# Patient Record
Sex: FEMALE | Race: WHITE | NOT HISPANIC OR LATINO | Employment: PART TIME | URBAN - METROPOLITAN AREA
[De-identification: names, ages, dates, MRNs, and addresses within clinical notes are randomized per-mention and may not be internally consistent; named-entity substitution may affect disease eponyms.]

---

## 2018-04-02 ENCOUNTER — RECORDS - HEALTHEAST (OUTPATIENT)
Dept: LAB | Facility: CLINIC | Age: 48
End: 2018-04-02

## 2018-04-02 LAB
ALBUMIN SERPL-MCNC: 3.3 G/DL (ref 3.5–5)
ALP SERPL-CCNC: 70 U/L (ref 45–120)
ALT SERPL W P-5'-P-CCNC: 12 U/L (ref 0–45)
ANION GAP SERPL CALCULATED.3IONS-SCNC: 11 MMOL/L (ref 5–18)
AST SERPL W P-5'-P-CCNC: 18 U/L (ref 0–40)
BILIRUB SERPL-MCNC: 0.5 MG/DL (ref 0–1)
BUN SERPL-MCNC: 13 MG/DL (ref 8–22)
CALCIUM SERPL-MCNC: 8.8 MG/DL (ref 8.5–10.5)
CHLORIDE BLD-SCNC: 100 MMOL/L (ref 98–107)
CO2 SERPL-SCNC: 24 MMOL/L (ref 22–31)
CREAT SERPL-MCNC: 0.69 MG/DL (ref 0.6–1.1)
ERYTHROCYTE [SEDIMENTATION RATE] IN BLOOD BY WESTERGREN METHOD: 25 MM/HR (ref 0–20)
GFR SERPL CREATININE-BSD FRML MDRD: >60 ML/MIN/1.73M2
GLUCOSE BLD-MCNC: 135 MG/DL (ref 70–125)
POTASSIUM BLD-SCNC: 3.9 MMOL/L (ref 3.5–5)
PROT SERPL-MCNC: 6.6 G/DL (ref 6–8)
SODIUM SERPL-SCNC: 135 MMOL/L (ref 136–145)

## 2018-05-30 ENCOUNTER — RECORDS - HEALTHEAST (OUTPATIENT)
Dept: LAB | Facility: CLINIC | Age: 48
End: 2018-05-30

## 2018-05-30 LAB — TSH SERPL DL<=0.005 MIU/L-ACNC: 1.76 UIU/ML (ref 0.3–5)

## 2020-10-13 ENCOUNTER — HOSPITAL ENCOUNTER (OUTPATIENT)
Dept: BEHAVIORAL HEALTH | Facility: CLINIC | Age: 50
Discharge: HOME | End: 2020-10-13
Attending: FAMILY MEDICINE | Admitting: FAMILY MEDICINE
Payer: COMMERCIAL

## 2020-10-13 VITALS — BODY MASS INDEX: 23.92 KG/M2 | HEIGHT: 62 IN | WEIGHT: 130 LBS

## 2020-10-13 DIAGNOSIS — F10.20 ALCOHOL USE DISORDER, SEVERE, DEPENDENCE (H): ICD-10-CM

## 2020-10-13 PROCEDURE — H0001 ALCOHOL AND/OR DRUG ASSESS: HCPCS | Mod: 95 | Performed by: COUNSELOR

## 2020-10-13 RX ORDER — EPINEPHRINE 0.3 MG/.3ML
0.3 INJECTION SUBCUTANEOUS
Status: ON HOLD | COMMUNITY
Start: 2020-07-30 | End: 2021-12-25

## 2020-10-13 ASSESSMENT — PAIN SCALES - GENERAL: PAINLEVEL: NO PAIN (0)

## 2020-10-13 ASSESSMENT — ANXIETY QUESTIONNAIRES
7. FEELING AFRAID AS IF SOMETHING AWFUL MIGHT HAPPEN: SEVERAL DAYS
GAD7 TOTAL SCORE: 6
6. BECOMING EASILY ANNOYED OR IRRITABLE: NOT AT ALL
1. FEELING NERVOUS, ANXIOUS, OR ON EDGE: NOT AT ALL
5. BEING SO RESTLESS THAT IT IS HARD TO SIT STILL: NOT AT ALL
2. NOT BEING ABLE TO STOP OR CONTROL WORRYING: SEVERAL DAYS
4. TROUBLE RELAXING: NEARLY EVERY DAY
3. WORRYING TOO MUCH ABOUT DIFFERENT THINGS: SEVERAL DAYS

## 2020-10-13 ASSESSMENT — PATIENT HEALTH QUESTIONNAIRE - PHQ9: SUM OF ALL RESPONSES TO PHQ QUESTIONS 1-9: 7

## 2020-10-13 ASSESSMENT — MIFFLIN-ST. JEOR: SCORE: 1152.93

## 2020-10-13 NOTE — PROGRESS NOTES
"[facility]  [address] [place] [id number]                ADULT CD ASSESSMENT ADDENDUM      Patient Name: Rosey Tucker  Cell Phone: [phone]   Email:  [email or web address]  Emergency Contact: Monica Kothari (friend) Tel: [phone]    The patient reported being:      With which race do you identify? White    Initial Screening Questions     1. Are you currently having severe withdrawal symptoms that are putting yourself or others in danger?  No    2. Are you currently having severe medical problems that require immediate attention?  No    3. Are you currently having severe emotional or behavioral problems that are putting yourself or others at risk of harm?  No    4. Do you have sufficient reading skills that will enable you to understand written materials, including the program rules and client rights materials?  Yes     Family History and other additional information     Who raised you? (parents, grandparents, adoptive parents, step-parents, etc.)    Both Parents    Please tell me what it was like growing up in your family. (please include any history of substance abuse, mental health issues, emotional/physical/sexual abuse, forms of discipline, and support)     \"fairly normal. My parents clint, but not all the time. Raised [employer]ism.\"  Mom- depression, anxiety, and dementia.    Do you have any children or Stepchildren? Yes, explain: 3 children- 2 adult and 1 minor    Are you being investigated by Child Protection Services? No    Do you have a child protection worker, probation office or ?  No    How would you describe your current finances?  Just making it    If you are having problems, (unpaid bills, bankruptcy, IRS problems) please explain:  No    If working or a student are you able to function appropriately in that setting? Yes     Describe your preferred learning style:  by hands-on practice    What are your some of your personal strengths?  \"I am patient. I cannot " "lie. I am not good at that. I like to think I am a nice person.\"    Do you currently participate in community shabbir activities, such as attending Pentecostalism, temple, [employer] or Worship services?  No    How does your spirituality impact your recovery?  \"I don't know. I am hoping it will help. I am not connected to any particular paris, but I think it would be good.\"    Do you currently self-administer your medications?  Yes    Have you ever had to lie to people important to you about how much you sullivan?   No   Have you ever felt the need to bet more and more money?   No   Have you ever attempted treatment for a gambling problem?   No   Have you ever touched or fondled someone else inappropriately or forced them to have sex with you against their will?   No   Are you or have you ever been a registered sex offender?   No   Is there any history of sexual abuse in your family? No   Have you ever felt obsessed by your sexual behavior, such as having sex with many partners, masturbating often, using pornography often?   No     Have you ever received therapy or stayed in the hospital for mental health problems?   Yes, explain: currently in therapy     Have you ever hurt yourself, such as cutting, burning or hitting yourself?   No     Have you ever purged, binged or restricted yourself as a way to control your weight?   No     Are you on a special diet?   No     Do you have any concerns regarding your nutritional status?   No     Have you had any appetite changes in the last 3 months?   No   Have you had weight loss or weight gain of more than 10 lbs in the last 3 months?   If patient gained or lost more than 10 lbs, then refer to program RN / attending Physician for assessment.   No   Was the patient informed of BMI?    Normal, No Intervention   No   Have you engaged in any risk-taking behavior that would put you at risk for exposure to blood-borne or sexually transmitted diseases?   No   Do you have any dental problems?   " "No   Have you ever lived through any trauma or stressful life events?   Yes- \"when my brother passed away, divorce.\" When my daughter attempted suicide. When I moved my mom and attempted to clean out her house. My dad passed away.\"   In the past month, have you had any of the following symptoms related to the trauma listed above? (dreams, intense memories, flashbacks, physical reactions, etc.)   No   Have you ever believed people were spying on you, or that someone was plotting against you or trying to hurt you?   No   Have you ever believed someone was reading your mind or could hear your thoughts or that you could actually read someone's mind or hear what another person was thinking?   No   Have you ever believed that someone of some force outside of yourself was putting thoughts into your mind or made you act in a way that was not your usual self?  Have you ever though you were possessed?   No   Have you ever believed you were being sent special messages through the TV, radio or newspaper?   No   Have you ever heard things other people couldn't hear, such as voices or other noises?   No   Have you ever had visions when you were awake?  Or have you ever seen things other people couldn't see?   No   Do you have a valid 's license?    Yes     PHQ-9, TRINY-7 and Suicide Risk Assessment   PHQ-9 on 10/13/2020 TRINY-7 on 10/13/2020   The patient's PHQ-9 score was 7 out of 27, indicating mild depression.   The patient's TRINY-7 score was 6 out of 21, indicating mild anxiety.       [place]-Suicide Severity Rating Scale   Suicide Ideation   1.) Have you ever wished you were dead or that you could go to sleep and not wake up?     Lifetime:  No   Past Month:  No     2.) Have you actually had any thoughts of killing yourself?   Lifetime:  No   Past Month:  No     3.) Have you been thinking about how you might do this?     Lifetime:  No   Past Month:  No     4.) Have you had these thoughts and had some intention of acting on " them?     Lifetime:  No   Past Month:  No     5.) Have you started to work out the details of how to kill yourself?   Lifetime:  No   Past Month:  No     6.) Do you intend to carry out this plan?      Lifetime:  No   Past Month:  No     Intensity of Ideation   Intensity of ideation (1 being least severe, 5 being most severe):     Lifetime:  The patient denied ever having any suicidal thoughts in life.   Past Month:  The patient denied ever having any suicidal thoughts in life.     How often do you have these thoughts?  The patient denied ever having any suicidal thoughts in life.     When you have the thoughts how long do they last?  The patient denied ever having any suicidal thoughts in life.     Can you stop thinking about killing yourself or wanting to die if you want to?  The patient denied ever having any suicidal thoughts in life.     Are there things - anyone or anything (i.e. family, Islam, pain of death) that stopped you from wanting to die or acting on thoughts of suicide?  Does not apply     What sort of reasons did you have for thinking about wanting to die or killing yourself (ie end pain, stop how you were feeling, get attention or reaction, revenge)?  Does not apply     Suicidal Behavior   (Suicide Attempt) - Have you made a suicide attempt?     Lifetime:  The patient had never made a suicide attempt.   Past Month:  The patient had never made a suicide attempt.     Have you engaged in self-harm (non-suicidal self-injury)?  The patient denied having any history of engaging in self-harm (non-suicidal self-injury).     (Interrupted Attempt) - Has there been a time when you started to do something to end your life but someone or something stopped you before you actually did anything?  No     (Aborted or Self-Interrupted Attempt) - Has there been a time when you started to do something to try to end your life but you stopped yourself before you actually did anything?  No     (Preparatory Acts of  Behavior) - Have you taken any steps towards making suicide attempt or preparing to kill yourself (such as collecting pills, getting a gun, giving valuables away or writing a suicide note)?  No     Actual Lethality/Medical Damage:  The patient denied ever making a suicidal attempt.       2008  [employer] [employer]  Used with permission by Mi Barcenas, PhD.       Guide to C-SSRS Risk Ratings   NO IDEATION:  with no active thoughts IDEATION: with a wish to die. IDEATION: with active thoughts. Risk Ratings   If Yes No No 0 - Very Low Risk   If NA Yes No 1 - Low Risk   If NA Yes Yes 2 - Low/moderate risk   IDEATION: associated thoughts of methods without intent or plan INTENT: Intent to follow through on suicide PLAN: Plan to follow through on suicide Risk Ratings cont...   If Yes No No 3 - Moderate Risk   If Yes Yes No 4 - High Risk   If Yes Yes Yes 5 - High Risk   The patient's ADDITIONAL RISK FACTORS and lack of PROTECTIVE FACTORS may increase their overall suicide risk ratings.     Additional Risk Factors:    A recent death of someone close to the patient and/or unresolved grief and loss issues     A recent loss that was significant to the patient, i.e. loss of job, loss of home, divorce, break-up, etc.   Protective Factors:    Having people in his/her life that would prevent the patient from considering a suicide attempt (i.e. young children, spouse, parents, etc.)     Having pet(s) that give companionship and/or would prevent the patient from considering a suicide attempt     A positive relationship with his/her clinical medical and/or mental health providers     Having easy access to supportive family members     Having a good community support network     Having cultural, Rastafari or spiritual beliefs that discourage suicide     Having restricted access to highly lethal means of suicide     Risk Status   Past month: 0. - Very Low Risk:  Evaluation Counselors:  Document in Epic /  "SBAR to counselor \"Very Low Risk\".      Treatment Counselors:  Reassess upon admission as applicable, assess weekly in progress notes under Dimension 3 and summarize in Discharge / Treatment summary under Dimension 3.    Past 24 hours: 0. - Very Low Risk:  Evaluation Counselors:  Document in Epic / SBAR to counselor \"Very Low Risk\".      Treatment Counselors:  Reassess upon admission as applicable, assess weekly in progress notes under Dimension 3 and summarize in Discharge / Treatment summary under Dimension 3.   Additional information to support suicide risk rating: There was no additional information to provide at this time.     Mental Health Status   Physical Appearance/Attire: Unable to assess due to telephone visit.   Hygiene: Unable to assess due to telephone visit.   Eye Contact: Unable to assess due to telephone visit.   Speech Rate:  regular   Speech Volume: regular   Speech Quality: fluid   Cognitive/Perceptual:  reality based   Cognition: memory intact    Judgment: intact   Insight: intact   Orientation:  time, place, person and situation   Thought: logical    Hallucinations:  none   General Behavioral Tone: cooperative   Psychomotor Activity: Unable to assess due to telephone visit.   Gait:  Unable to assess due to telephone visit.   Mood: appropriate   Affect: congruence/appropriate   Counselor Notes: NA     Criteria for Diagnosis: DSM-5 Criteria for Substance Use Disorders      Alcohol Use Disorder Severe - 303.90 (F10.20)  Depression NOS, per patient self-report    Level of Care   I.) Intoxication and Withdrawal: 0   II.) Biomedical:  0   III.) Emotional and Behavioral:  2   IV.) Readiness to Change:  1   V.) Relapse Potential: 2   VI.) Recovery Environmental: 1     Initial Problem List     The patient lacks relapse prevention skills  The patient has poor coping skills  The patient has poor refusal skills   The patient lacks a sober peer support network  The patient has a tendency to isolate  The " patient has a significant history of grief and loss issues    Patient/Client is willing to follow treatment recommendations.  Yes    Counselor: DARIUS Tejada

## 2020-10-13 NOTE — PROGRESS NOTES
"The patient has been notified of the following:      \"We have found that certain health care needs can be provided without the need for a face to face visit.  This service lets us provide the care you need with a phone conversation.       I will have full access to your [facility] medical record during this entire phone call.   I will be taking notes for your medical record.      Since this is like an office visit, we will bill your insurance company for this service.       There are potential benefits and risks of telephone visits (e.g. limits to patient confidentiality) that differ from in-person visits.?  Confidentiality still applies for telephone services, and nobody will record the visit.  It is important to be in a quiet, private space that is free of distractions (including cell phone or other devices) during the visit.??      If during the course of the call I believe a telephone visit is not appropriate, you will not be charged for this service\"     Consent has been obtained for this service by care team member: Yes     Start Time: 12:30pm  End Time: 1:44pm    Rule 25 Assessment  Background Information   1. Date of Assessment Request  2. Date of Assessment  10/13/2020 3. Date Service Authorized     4.   ADARSH Richards     5.  Phone Number   [phone] 6. Referent  Self 7. Assessment Site  [facility]     8. Client Name   Rosey Tucker 9. Date of Birth  1970 Age  50 year old 10. Gender  female  11. PMI/ Insurance No.  [id number]   12. Client's Primary Language:  English 13. Do you require special accommodations, such as an  or assistance with written material? No   14. Current Address: [address] [place] [address]   15. Client Phone Numbers: [phone]      16. Tell me what has happened to bring you here today.    \"my son looked up this number and said I needed this assessment.\"     17. Have you had " "other rule 25 assessments?     No    DIMENSION I - Acute Intoxication /Withdrawal Potential   1. Chemical use most recent 12 months outside a facility and other significant use history (client self-report)              X = Primary Drug Used   Age of First Use Most Recent Pattern of Use and Duration   Need enough information to show pattern (both frequency and amounts) and to show tolerance for each chemical that has a diagnosis   Date of last use and time, if needed   Withdrawal Potential? Requiring special care Method of use  (oral, smoked, snort, IV, etc)   x   Alcohol     14 Past year: \"every day I have to have 2-3 glasses of wine.\" This has been going on for over a year.    Weekends: drinking a bottle of wine on Friday and Saturday, sometimes on Sunday a couple of beers.  Weekdays: 2-3 glasses of wine. Some days none.    Hard alcohol: some fireball shots once in a while,   Mostly drinking wine.   10/12/20  2 larege glasses of wine  oral      Marijuana/  Hashish   16 Used in college. 25 years ago no smoke      Cocaine/Crack     No use          Meth/  Amphetamines   No use          Heroin     No use          Other Opiates/  Synthetics   No use          Inhalants     No use          Benzodiazepines     No use          Hallucinogens     No use          Barbiturates/  Sedatives/  Hypnotics No use          Over-the-Counter Drugs   No use          Other     No use          Nicotine     14 Current: some day smoker; generally 1-5 cigarettes per day. 10/13/20 no smoke     2. Do you use greater amounts of alcohol/other drugs to feel intoxicated or achieve the desired effect?  Yes.  Or use the same amount and get less of an effect?  Yes.  Example: The patient reported having increased use and tolerance issues with alcohol.    3A. Have you ever been to detox?     No    3B. When was the first time?     The patient denied ever having a detoxification admission.    3C. How many times since then?     The patient denied ever " having a detoxification admission.    3D. Date of most recent detox:     The patient denied ever having a detoxification admission.    4.  Withdrawal symptoms: Have you had any of the following withdrawal symptoms?  Past 12 months Recent (past 30 days)   Headache  Fatigue / Extremely Tired  Sad / Depressed Feeling Headache  Fatigue / Extremely Tired  Sad / Depressed Feeling     's Visual Observations and Symptoms: No visible withdrawal symptoms at this time    Based on the above information, is withdrawal likely to require attention as part of treatment participation?  No    Dimension I Ratings   Acute intoxication/Withdrawal potential - The placing authority must use the criteria in Dimension I to determine a client s acute intoxication and withdrawal potential.    RISK DESCRIPTIONS - Severity ratin Client displays full functioning with good ability to tolerate and cope with withdrawal discomfort. No signs or symptoms of intoxication or withdrawal or resolving signs or symptoms.    REASONS SEVERITY WAS ASSIGNED (What about the amount of the person s use and date of most recent use and history of withdrawal problems suggests the potential of withdrawal symptoms requiring professional assistance? )     Patient reports that her last use of alcohol was on 10/12/2020.  Pt denies having any feelings of withdrawal.          DIMENSION II - Biomedical Complications and Conditions   1a. Do you have any current health/medical conditions?(Include any infectious diseases, allergies, or chronic or acute pain, history of chronic conditions)       Acid reflux.    1b. On a scale of mild, moderate to severe please specify the severity of the patient's diabetes and/or neuropathy.    The patient denied having a history of being diagnosed with diabetes or neuropathy.    2. Do you have a health care provider? When was your most recent appointment? What concerns were identified?     The patient's PCP is Dr. Armando  Adolfo.  The patient's Primary Medical Clinic is [facility].  The patient's last medical appointment was a couple of months ago.    3. If indicated by answers to items 1 or 2: How do you deal with these concerns? Is that working for you? If you are not receiving care for this problem, why not?      The patient reported taking prescription medications as prescribed for the above medical issues.    4A. List current medication(s) including over-the-counter or herbal supplements--including pain management:     pantoprazole (?)  Generic depression med    4B. Do you follow current medical recommendations/take medications as prescribed?     Yes    4C. When did you last take your medication?     10/12/20    4D. Do you need a referral to have a follow up with a primary care physician?    No.    5. Has a health care provider/healer ever recommended that you reduce or quit alcohol/drug use?     No    6. Are you pregnant?     No    7. Have you had any injuries, assaults/violence towards you, accidents, health related issues, overdose(s) or hospitalizations related to your use of alcohol or other drugs:     No    8. Do you have any specific physical needs/accommodations? No    Dimension II Ratings   Biomedical Conditions and Complications - The placing authority must use the criteria in Dimension II to determine a client s biomedical conditions and complications.   RISK DESCRIPTIONS - Severity ratin Client displays full functioning with good ability to cope with physical discomfort.    REASONS SEVERITY WAS ASSIGNED (What physical/medical problems does this person have that would inhibit his or her ability to participate in treatment? What issues does he or she have that require assistance to address?)    Patient denies having any chronic biomedical conditions that would interfere with treatment or any recovery skills training/workshop. Pt reports having acid reflux and takes pantoprazole for it.  Pt denies  "having pain at this time and reports her pain level is a 0 on the 0-10 Pain Rating Scale. Pt reports that she is a daily cigarette smoker.         DIMENSION III - Emotional, Behavioral, Cognitive Conditions and Complications   1. (Optional) Tell me what it was like growing up in your family. (substance use, mental health, discipline, abuse, support)     \"fairly normal. My parents clint, but not all the time. Raised [employer].\"  Mom- depression, anxiety, and dementia.     2. When was the last time that you had significant problems...  A. with feeling very trapped, lonely, sad, blue, depressed or hopeless  about the future? Past Month- \"Covid is not helping at all.\"     B. with sleep trouble, such as bad dreams, sleeping restlessly, or falling  asleep during the day? Past Month- hard time falling asleep.    C. with feeling very anxious, nervous, tense, scared, panicked, or like  something bad was going to happen? Never    D. with becoming very distressed and upset when something reminded  you of the past? Never    E. with thinking about ending your life or committing suicide? Never    3. When was the last time that you did the following things two or more times?  A. Lied or conned to get things you wanted or to avoid having to do  something? Never    B. Had a hard time paying attention at school, work, or home? Never    C. Had a hard time listening to instructions at school, work, or home? Never    D. Were a bully or threatened other people? Never    E. Started physical fights with other people? Never    Note: These questions are from the Global Appraisal of Individual Needs--Short Screener. Any item marked  past month  or  2 to 12 months ago  will be scored with a severity rating of at least 2.     For each item that has occurred in the past month or past year ask follow up questions to determine how often the person has felt this way or has the behavior occurred? How recently? How has it affected their daily living? " "And, whether they were using or in withdrawal at the time?    See above    4A. If the person has answered item 2E with  in the past year  or  the past month , ask about frequency and history of suicide in the family or someone close and whether they were under the influence.     Not in the past year    Any history of suicide in your family? Or someone close to you?     \"my daughter attempted. That lingers a bit.\"    4B. If the person answered item 2E  in the past month  ask about  intent, plan, means and access and any other follow-up information  to determine imminent risk. Document any actions taken to intervene  on any identified imminent risk.      The patient denied having any suicide ideation within the past month.    5A. Have you ever been diagnosed with a mental health problem?     Yes, explain: \"depression\"      5B. Are you receiving care for any mental health issues? If yes, what is the focus of that care or treatment?  Are you satisfied with the service? Most recent appointment?  How has it been helpful?     In the past she has worked with a therapist. She started [facility] last week.    6. Have you been prescribed medications for emotional/psychological problems?     Yes, a generic depression medication.    7. Does your MH provider know about your use?     Yes.  7B. What does he or she have to say about it?(DSM) \"she basically said pay attention and ask yourself why do you need that second glass.\"    8A. Have you ever been verbally, emotionally, physically or sexually abused?      No     Follow up questions to learn current risk, continuing emotional impact.      The patient denied having any history of being verbally, emotionally, physically or sexually abused.    8B. Have you received counseling for abuse?      The patient denied having any history of being verbally, emotionally, physically or sexually abused.    9. Have you ever experienced or been part of a group that experienced community violence, " historical trauma, rape or assault?     No    10A. [place]:    No    11. Do you have problems with any of the following things in your daily life?    Problem Solving and Remembering      Note: If the person has any of the above problems, follow up with items 12, 13, and 14. If none of the issues in item 11 are a problem for the person, skip to item 15.    The patient would benefit from developing sober coping skills.    12. Have you been diagnosed with traumatic brain injury or Alzheimer s?  No    13. If the answer to #12 is no, ask the following questions:    Have you ever hit your head or been hit on the head? No    Were you ever seen in the Emergency Room, hospital or by a doctor because of an injury to your head? No    Have you had any significant illness that affected your brain (brain tumor, meningitis, West Nile Virus, stroke or seizure, heart attack, near drowning or near suffocation)? No    14. If the answer to #12 is yes, ask if any of the problems identified in #11 occurred since the head injury or loss of oxygen. The patient had never had a head injury or a loss of oxygen.    15A. Highest grade of school completed:     Associate degree/vocational certificate    15B. Do you have a learning disability? No    15C. Did you ever have tutoring in Math or English? No    15D. Have you ever been diagnosed with Fetal Alcohol Effects or Fetal Alcohol Syndrome? No    16. If yes to item 15 B, C, or D: How has this affected your use or been affected by your use?     The patient denied having any history of a learning disability, tutoring in math or English or being diagnosed with Fetal Alcohol Effects or Fetal Alcohol Syndrome.    Dimension III Ratings   Emotional/Behavioral/Cognitive - The placing authority must use the criteria in Dimension III to determine a client s emotional, behavioral, and cognitive conditions and complications.   RISK DESCRIPTIONS - Severity ratin Client has difficulty with impulse control  "and lacks coping skills. Client has thoughts of suicide or harm to others without means; however, the thoughts may interfere with participation in some treatment activities. Client has difficulty functioning in significant life areas. Client has moderate symptoms of emotional, behavioral, or cognitive problems. Client is able to participate in most treatment activities.    REASONS SEVERITY WAS ASSIGNED - What current issues might with thinking, feelings or behavior pose barriers to participation in a treatment program? What coping skills or other assets does the person have to offset those issues? Are these problems that can be initially accommodated by a treatment provider? If not, what specialized skills or attributes must a provider have?    The patient reports having mental health diagnosis of depression. Pt is taking a generic depression medication for her mental health at this time. She started seeing a [facility] counselor last week. Pt denies a history of abuse. At the time of the assessment, pt's PHQ-9 score was 7 (mild depression) and her TRINY-7 score was 6 (mild anxiety). Pt denies SIB, SA, suicidal thoughts at this time. During pt's assessment, her [place]-Suicide Severity Rating Scale score was 0. - Very Low Risk.         DIMENSION IV - Readiness for Change   1. You ve told me what brought you here today. (first section) What do you think the problem really is?     \"I don't know. I suppose not being able to stop after that first, second glass of wine. I try to ignore all the negative. Not being able to keep up.\" Drinking to numb/block out things that are going on.    2. Tell me how things are going. Ask enough questions to determine whether the person has use related problems or assets that can be built upon in the following areas: Family/friends/relationships; Legal; Financial; Emotional; Educational; Recreational/ leisure; Vocational/employment; Living arrangements (DSM)      The patient currently lives with " "her 16 year old son.  The patient denied having any concerns regarding her immediate living environment or neighborhood. The patient reported having a relationship conflict with her children due to her ongoing alcohol abuse issues. The patient identified as being heterosexual and she reports she is in a romantic relationship with her boyfriend for the past 3 years. The patient denied having a history of legal issues. 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 The patient lacks a current sober peer support network.    3. What activities have you engaged in when using alcohol/other drugs that could be hazardous to you or others (i.e. driving a car/motorcycle/boat, operating machinery, unsafe sex, sharing needles for drugs or tattoos, etc     The patient denied engaging in any of the above dangerous activities when using alcohol and/or drugs.    4. How much time do you spend getting, using or getting over using alcohol or drugs? (DSM)     Weekdays: \"after work, about 5.\" She'd have her last glass at \"probably 9, maybe 10\"pm.  Weekends: \"probably around the same time, maybe 4 if I am home. I stay up a little bit later, maybe 11:30pm.\"    5. Reasons for drinking/drug use (Use the space below to record answers. It may not be necessary to ask each item.)  Like the feeling Yes   Trying to forget problems Yes   To cope with stress Yes   To relieve physical pain No   To cope with anxiety No   To cope with depression Yes   To relax or unwind Yes   Makes it easier to talk with people sometimes   Partner encourages use No   Most friends drink or use Yes   To cope with family problems No   Afraid of withdrawal symptoms/to feel better No   Other (specify)  No     A. What concerns other people about your alcohol or drug use/Has anyone told you that you use too much? What did they say? (DSM)     Children's concerns: \"I don't know I guess. They want me to be healthy.\" Her 16 year old son wanted " "her to complete this assessment.    B. When did you first think you had a problem with drugs or alcohol?    Alcohol: \"probably a year ago when it started getting a little bit crazy.\"    6. What changes are you willing to make? What substance are you willing to stop using? How are you going to do that? Have you tried that before? What interfered with your success with that goal?      What specific changes are you willing to make? \"I guess I would really like to be able to keep the drinking to socializing on weekends. Even then, it wouldn't have to be every weekend.\"  Why do you want to make this change? \"just to be a better mom again, the respect of my kids.\"  What are you willing to commit to in order to make this change? \"I don't know. Whatever I have to do.\"  What is getting in your way to make this change? \"just me.\"    7. What would be helpful to you in making this change?     \"I don't know. I honestly think I can do it. I just need to come up with some [id number]. Maybe some Bible sentences or verses. Meditation. I don't think I have time to do it. I would love to go to [employer], but you can't because of covid.\"    Dimension IV Ratings   Readiness for Change - The placing authority must use the criteria in Dimension IV to determine a client s readiness for change.   RISK DESCRIPTIONS - Severity ratin Client is motivated with active reinforcement, to explore treatment and strategies for change, but ambivalent about illness or need for change.    REASONS SEVERITY WAS ASSIGNED - (What information did the person provide that supports your assessment of his or her readiness to change? How aware is the person of problems caused by continued use? How willing is she or he to make changes? What does the person feel would be helpful? What has the person been able to do without help?)      Pt's 16 year old son asked her to complete this CD evaluation.  She thought she had a problem with alcohol about a year ago when " "she began drinking more. When it comes to her drinking, \"I just want to be able to control it. I don't know if that\" she wants to stop drinking. She is open to attending programming at [facility] if insurance covers the program. She is also open to working with a psychotherapist who is well versed with addiction.           DIMENSION V - Relapse, Continued Use, and Continued Problem Potential   1A. In what ways have you tried to control, cut-down or quit your use? If you have had periods of sobriety, how did you accomplish that? What was helpful? What happened to prevent you from continuing your sobriety? (DSM)     Control: \"I guess I try to tell myself that I don't need it. Go for a walk.\"  Longest sober time: 4 days.  How did you do it: \"I just didn't stop and get a bottle of wine. I made myself drive right home.\"  Why did you relapse? \"because it was the weekend.\"  What are your reasons for using? \"some times I honestly I enjoy cleaning and having a glass of wine. Cooking and having a glass of wine. putzing around...\"    1B. What were the circumstances of your most recent relapse with mood altering chemicals?    She drank because it was the weekend.    2. Have you experienced cravings? If yes, ask follow up questions to determine if the person recognizes triggers and if the person has had any success in dealing with them.     In the past 30 days, on a scale of 0-10 (0 least severe to 10 being most severe), how would you rate your cravings?  Alcohol: 6    3. Have you been treated for alcohol/other drug abuse/dependence? No    4. Support group participation: Have you/do you attend support group meetings to reduce/stop your alcohol/drug use? How recently? What was your experience? Are you willing to restart? If the person has not participated, is he or she willing?     She has never attended before. She reports she thought about it, but didn't go.    5. What would assist you in staying sober/straight?     \"I " "just want to be able to control it. I don't know if that\" she wants to stop drinking.    Dimension V Ratings   Relapse/Continued Use/Continued problem potential - The placing authority must use the criteria in Dimension V to determine a client s relapse, continued use, and continued problem potential.   RISK DESCRIPTIONS - Severity ratin (A) Client has minimal recognition and understanding of relapse and recidivism issues and displays moderate vulnerability for further substance use or mental health problems. (B) Client has some coping skills inconsistently applied.    REASONS SEVERITY WAS ASSIGNED - (What information did the person provide that indicates his or her understanding of relapse issues? What about the person s experience indicates how prone he or she is to relapse? What coping skills does the person have that decrease relapse potential?)      Pt denies having ever attended a cd treatment program, a detox program, or a 12 step meeting before. Pt lacks education into her disease of addiction. In the past 30 days, pt rates her cravings for alcohol as a 6 on the 0-10 Craving Scale. Pt identified her biggest reasons to drink is \"some times I honestly I enjoy cleaning and having a glass of wine. Cooking and having a glass of wine. putzing around...\" Pt displays moderate vulnerability for further substance use.         DIMENSION VI - Recovery Environment   1. Are you employed/attending school? Tell me about that.     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  Work hours: she works 8 hour shifts, 3 days a week- Monday, Wednesday, and Thursday and 2 half shifts. She usually works 9-5pm. \"In home care it is on and off.\"    2A. Describe a typical day; evening for you. Work, school, social, leisure, volunteer, spiritual practices. Include time spent obtaining, using, recovering from drugs or alcohol. (DSM)     \"I start with a morning huddle. My mornings are not routine, but " "I would like to get to that. I start with that and then let the dog out. Gather my work stuff, make a few phone calls, head out on the road. Laundry. My days are not typical, they are all different.\"    Hobbies: \"I don't have any right now. I used to run. I like hiking. I did go for a hike a couple of weeks ago.\"    Please describe what leisure activities have been associated with your substance abuse:     Cooking, cleaning    2B. How often do you spend more time than you planned using or use more than you planned? (DSM)     \"on the weekend for sure. And some nights during the week.\"    3. How important is using to your social connections? Do many of your family or friends use?     Friends: 98% drink    4A. Are you currently in a significant relationship?     Yes.  4B. How long? 3 years            Please describe your significant other's use of mood altering chemicals? He doesn't drink often. \"we will go out and hit some breweries.\"    4C. Sexual Orientation:     Heterosexual    5A. Who do you live with?      Her 16 year old son.    5B. Tell me about their alcohol/drug use and mental health issues.     none    5C. Are you concerned for your safety there? No    5D. Are you concerned about the safety of anyone else who lives with you? No    6A. Do you have children who live with you?     Yes- 16 year old son    6B. Do you have children who do not live with you?     Yes- 2 daughters. One in college and the other is 22.    7A. Who supports you in making changes in your alcohol or drug use? What are they willing to do to support you? Who is upset or angry about you making changes in your alcohol or drug use? How big a problem is this for you?      \"2 best friends. My kids.\"    7B. This table is provided to record information about the person s relationships and available support It is not necessary to ask each item; only to get a comprehensive picture of their support system.  How often can you count on the following " people when you need someone?   Partner / Spouse Usually supportive   Parent(s)/Aunt(s)/Uncle(s)/Grandparents Rarely supportive- mom has dementia    Sibling(s)/Cousin(s) Usually supportive   Child(steph) Usually supportive   Other relative(s) Usually supportive   Friend(s)/neighbor(s) Usually supportive   Child(steph) s father(s)/mother(s) Always supportive   Support group member(s) The patient denied having any current involvement with 12-step or other support group meetings.   Community of shabbir members The patient denied having any current involvement with community shabbir members.   /counselor/therapist/healer Always supportive   Other (specify) No     8A. What is your current living situation?     She has lived in the house she owns since .    8B. What is your long term plan for where you will be living?     No changes.    8C. Tell me about your living environment/neighborhood? Ask enough follow up questions to determine safety, criminal activity, availability of alcohol and drugs, supportive or antagonistic to the person making changes.      No concerns.    9. Criminal justice history: Gather current/recent history and any significant history related to substance use--Arrests? Convictions? Circumstances? Alcohol or drug involvement? Sentences? Still on probation or parole? Expectations of the court? Current court order? Any sex offenses - lifetime? What level? (DSM)    None    10. What obstacles exist to participating in treatment? (Time off work, childcare, funding, transportation, pending intermediate time, living situation)     The patient denied having any obstacles for participating in substance abuse treatment.    Dimension VI Ratings   Recovery environment - The placing authority must use the criteria in Dimension VI to determine a client s recovery environment.   RISK DESCRIPTIONS - Severity ratin Client has passive social  or family and significant other are not interested in  the client's recovery. The client is engaged in structured meaningful activity.    REASONS SEVERITY WAS ASSIGNED - (What support does the person have for making changes? What structure/stability does the person have in his or her daily life that will increase the likelihood that changes can be sustained? What problems exist in the person s environment that will jeopardize getting/staying clean and sober?)     The patient currently lives with her 16 year old son.  The patient denied having any concerns regarding her immediate living environment or neighborhood. The patient reported having a relationship conflict with her children due to her ongoing alcohol abuse issues. The patient identified as being heterosexual and she reports she is in a romantic relationship with her boyfriend for the past 3 years. The patient denied having a history of legal issues. 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 The patient lacks a current sober peer support network.         Client Choice/Exceptions   Would you like services specific to language, age, gender, culture, Tenriism preference, race, ethnicity, sexual orientation or disability?  No    What particular treatment choices and options would you like to have? None    Do you have a preference for a particular treatment program? None    Criteria for Diagnosis     Criteria for Diagnosis  DSM-5 Criteria for Substance Use Disorder  Instructions: Determine whether the client currently meets the criteria for Substance Use Disorder using the diagnostic criteria in the DSM-V pp.481-58. Current means during the most recent 12 months outside a facility that controls access to substances    Category of Substance Severity (ICD-10 Code / DSM 5 Code)     Alcohol Use Disorder Severe  (10.20) (303.90)   Cannabis Use Disorder The patient does not meet the criteria for a Cannabis use disorder.   Hallucinogen Use Disorder The patient does not meet the  criteria for a Hallucinogen use disorder.   Inhalant Use Disorder The patient does not meet the criteria for an Inhalant use disorder.   Opioid Use Disorder The patient does not meet the criteria for an Opioid use disorder.   Sedative, Hypnotic, or Anxiolytic Use Disorder The patient does not meet the criteria for a Sedative/Hypnotic use disorder.   Stimulant Related Disorder The patient does not meet the criteria for a Stimulant use disorder.   Tobacco Use Disorder Mild    (Z72.0) (305.1)   Other (or unknown) Substance Use Disorder The patient does not meet the criteria for a Other (or unknown) Substance use disorder.       Collateral Contact Summary   Number of contacts made: 1    Contact with referring person:  No    If court related records were reviewed, summarize here: No court records had been reviewed at the time of this documentation.    Information from collateral contacts supported/largely agreed with information from the client and associated risk ratings.      Rule 25 Assessment Summary and Plan   's Recommendation    1)  Complete the OP program at [facility] if covered by insurance.  2)  If OP at [facility] is not covered by insurance, please attend weekly individual psychotherapy that addresses addiction.  3)  Consider attending weekly support group meetings, such as 12 step based (AA/NA), SM[facility] Recovery, [facility], and/or [facility] meetings.     4)  Follow all the recommendations of your treatment/medical providers.      Collateral Contacts     Name:    [facility]   Relationship:    [facility]   Phone Number:    NA Releases:    NA     The patient's medical record at [facility] was reviewed and the information contained in the medical record supported the patient's account of her chemical use history and chemical use consequences.    ollateral Contacts      A problematic pattern of alcohol/drug use leading to clinically significant impairment or distress, as  manifested by at least two of the following, occurring within a 12-month period:    1.) Alcohol/drug is often taken in larger amounts or over a longer period than was intended.  2.) There is a persistent desire or unsuccessful efforts to cut down or control alcohol/drug use  3.) A great deal of time is spent in activities necessary to obtain alcohol, use alcohol, or recover from its effects.  4.) Craving, or a strong desire or urge to use alcohol/drug  5.) Recurrent alcohol/drug use resulting in a failure to fulfill major role obligations at work, school or home.  6.) Continued alcohol use despite having persistent or recurrent social or interpersonal problems caused or exacerbated by the effects of alcohol/drug.  10.) Tolerance, as defined by either of the following: A need for markedly increased amounts of alcohol/drug to achieve intoxication or desired effect.  11.) Withdrawal, as manifested by either of the following: The characteristic withdrawal syndrome for alcohol/drug (refer to Criteria A and B of the criteria set for alcohol/drug withdrawal).      Specify if: In early remission:  After full criteria for alcohol/drug use disorder were previously met, none of the criteria for alcohol/drug use disorder have been met for at least 3 months but for less than 12 months (with the exception that Criterion A4,  Craving or a strong desire or urge to use alcohol/drug  may be met).     In sustained remission:   After full criteria for alcohol use disorder were previously met, non of the criteria for alcohol/drug use disorder have been met at any time during a period of 12 months or longer (with the exception that Criterion A4,  Craving or strong desire or urge to use alcohol/drug  may be met).   Specify if:   This additional specifier is used if the individual is in an environment where access to alcohol is restricted.    Mild: Presence of 2-3 symptoms  Moderate: Presence of 4-5 symptoms  Severe: Presence of 6 or more  symptoms

## 2020-10-14 ASSESSMENT — ANXIETY QUESTIONNAIRES: GAD7 TOTAL SCORE: 6

## 2021-05-25 ENCOUNTER — RECORDS - HEALTHEAST (OUTPATIENT)
Dept: ADMINISTRATIVE | Facility: CLINIC | Age: 51
End: 2021-05-25

## 2021-06-05 ENCOUNTER — RECORDS - HEALTHEAST (OUTPATIENT)
Dept: MAMMOGRAPHY | Facility: HOSPITAL | Age: 51
End: 2021-06-05

## 2021-06-05 DIAGNOSIS — N63.0 LUMP OR MASS IN BREAST: ICD-10-CM

## 2021-06-25 ENCOUNTER — RECORDS - HEALTHEAST (OUTPATIENT)
Dept: LAB | Facility: CLINIC | Age: 51
End: 2021-06-25

## 2021-06-25 LAB
ALBUMIN SERPL-MCNC: 4.1 G/DL (ref 3.5–5)
ALP SERPL-CCNC: 70 U/L (ref 45–120)
ALT SERPL W P-5'-P-CCNC: 29 U/L (ref 0–45)
ANION GAP SERPL CALCULATED.3IONS-SCNC: 12 MMOL/L (ref 5–18)
AST SERPL W P-5'-P-CCNC: 46 U/L (ref 0–40)
BILIRUB DIRECT SERPL-MCNC: 0.2 MG/DL
BILIRUB SERPL-MCNC: 0.4 MG/DL (ref 0–1)
BUN SERPL-MCNC: 11 MG/DL (ref 8–22)
CALCIUM SERPL-MCNC: 9.3 MG/DL (ref 8.5–10.5)
CHLORIDE BLD-SCNC: 105 MMOL/L (ref 98–107)
CHOLEST SERPL-MCNC: 224 MG/DL
CO2 SERPL-SCNC: 25 MMOL/L (ref 22–31)
CREAT SERPL-MCNC: 0.72 MG/DL (ref 0.6–1.1)
FASTING STATUS PATIENT QL REPORTED: ABNORMAL
GFR SERPL CREATININE-BSD FRML MDRD: >60 ML/MIN/1.73M2
GLUCOSE BLD-MCNC: 96 MG/DL (ref 70–125)
HDLC SERPL-MCNC: 88 MG/DL
LDLC SERPL CALC-MCNC: 115 MG/DL
POTASSIUM BLD-SCNC: 3.7 MMOL/L (ref 3.5–5)
PROT SERPL-MCNC: 6.9 G/DL (ref 6–8)
SODIUM SERPL-SCNC: 142 MMOL/L (ref 136–145)
TRIGL SERPL-MCNC: 106 MG/DL
TSH SERPL DL<=0.005 MIU/L-ACNC: 1.36 UIU/ML (ref 0.3–5)

## 2021-07-21 ENCOUNTER — RECORDS - HEALTHEAST (OUTPATIENT)
Dept: ADMINISTRATIVE | Facility: CLINIC | Age: 51
End: 2021-07-21

## 2021-07-22 ENCOUNTER — RECORDS - HEALTHEAST (OUTPATIENT)
Dept: SCHEDULING | Facility: CLINIC | Age: 51
End: 2021-07-22

## 2021-07-22 DIAGNOSIS — Z12.31 OTHER SCREENING MAMMOGRAM: ICD-10-CM

## 2021-08-12 ENCOUNTER — HOSPITAL ENCOUNTER (OUTPATIENT)
Dept: BEHAVIORAL HEALTH | Facility: CLINIC | Age: 51
Discharge: HOME | End: 2021-08-12
Attending: FAMILY MEDICINE | Admitting: FAMILY MEDICINE
Payer: COMMERCIAL

## 2021-08-12 VITALS — WEIGHT: 130 LBS | BODY MASS INDEX: 23.04 KG/M2 | HEIGHT: 63 IN

## 2021-08-12 PROCEDURE — H0001 ALCOHOL AND/OR DRUG ASSESS: HCPCS | Mod: GT

## 2021-08-12 RX ORDER — CITALOPRAM HYDROBROMIDE 20 MG/1
20 TABLET ORAL DAILY
Status: ON HOLD | COMMUNITY
End: 2021-12-23

## 2021-08-12 RX ORDER — PANTOPRAZOLE SODIUM 20 MG/1
40 TABLET, DELAYED RELEASE ORAL DAILY
Status: ON HOLD | COMMUNITY
End: 2021-12-23

## 2021-08-12 RX ORDER — LAMOTRIGINE 100 MG/1
100 TABLET, ORALLY DISINTEGRATING ORAL 2 TIMES DAILY
Status: ON HOLD | COMMUNITY
End: 2021-12-23

## 2021-08-12 ASSESSMENT — PAIN SCALES - GENERAL: PAINLEVEL: NO PAIN (0)

## 2021-08-12 ASSESSMENT — COLUMBIA-SUICIDE SEVERITY RATING SCALE - C-SSRS
ATTEMPT PAST THREE MONTHS: NO
ATTEMPT LIFETIME: NO
2. HAVE YOU ACTUALLY HAD ANY THOUGHTS OF KILLING YOURSELF LIFETIME?: NO
4. HAVE YOU HAD THESE THOUGHTS AND HAD SOME INTENTION OF ACTING ON THEM?: NO
6. HAVE YOU EVER DONE ANYTHING, STARTED TO DO ANYTHING, OR PREPARED TO DO ANYTHING TO END YOUR LIFE?: NO
4. HAVE YOU HAD THESE THOUGHTS AND HAD SOME INTENTION OF ACTING ON THEM?: NO
TOTAL  NUMBER OF ABORTED OR SELF INTERRUPTED ATTEMPTS PAST LIFETIME: NO
2. HAVE YOU ACTUALLY HAD ANY THOUGHTS OF KILLING YOURSELF?: NO
TOTAL  NUMBER OF INTERRUPTED ATTEMPTS LIFETIME: NO
5. HAVE YOU STARTED TO WORK OUT OR WORKED OUT THE DETAILS OF HOW TO KILL YOURSELF? DO YOU INTEND TO CARRY OUT THIS PLAN?: NO
1. IN THE PAST MONTH, HAVE YOU WISHED YOU WERE DEAD OR WISHED YOU COULD GO TO SLEEP AND NOT WAKE UP?: NO
TOTAL  NUMBER OF ABORTED OR SELF INTERRUPTED ATTEMPTS PAST 3 MONTHS: NO
5. HAVE YOU STARTED TO WORK OUT OR WORKED OUT THE DETAILS OF HOW TO KILL YOURSELF? DO YOU INTEND TO CARRY OUT THIS PLAN?: NO
1. IN THE PAST MONTH, HAVE YOU WISHED YOU WERE DEAD OR WISHED YOU COULD GO TO SLEEP AND NOT WAKE UP?: NO
TOTAL  NUMBER OF INTERRUPTED ATTEMPTS PAST 3 MONTHS: NO
3. HAVE YOU BEEN THINKING ABOUT HOW YOU MIGHT KILL YOURSELF?: NO
6. HAVE YOU EVER DONE ANYTHING, STARTED TO DO ANYTHING, OR PREPARED TO DO ANYTHING TO END YOUR LIFE?: NO

## 2021-08-12 ASSESSMENT — ANXIETY QUESTIONNAIRES
GAD7 TOTAL SCORE: 11
1. FEELING NERVOUS, ANXIOUS, OR ON EDGE: SEVERAL DAYS
7. FEELING AFRAID AS IF SOMETHING AWFUL MIGHT HAPPEN: MORE THAN HALF THE DAYS
6. BECOMING EASILY ANNOYED OR IRRITABLE: NOT AT ALL
4. TROUBLE RELAXING: NEARLY EVERY DAY
3. WORRYING TOO MUCH ABOUT DIFFERENT THINGS: NEARLY EVERY DAY
IF YOU CHECKED OFF ANY PROBLEMS ON THIS QUESTIONNAIRE, HOW DIFFICULT HAVE THESE PROBLEMS MADE IT FOR YOU TO DO YOUR WORK, TAKE CARE OF THINGS AT HOME, OR GET ALONG WITH OTHER PEOPLE: SOMEWHAT DIFFICULT
2. NOT BEING ABLE TO STOP OR CONTROL WORRYING: SEVERAL DAYS
5. BEING SO RESTLESS THAT IT IS HARD TO SIT STILL: SEVERAL DAYS

## 2021-08-12 ASSESSMENT — MIFFLIN-ST. JEOR: SCORE: 1163.81

## 2021-08-12 ASSESSMENT — PATIENT HEALTH QUESTIONNAIRE - PHQ9: SUM OF ALL RESPONSES TO PHQ QUESTIONS 1-9: 9

## 2021-08-12 NOTE — PROGRESS NOTES
[facility]  Provider Name:  Merlyn Rao     Credentials:  [facility]    PATIENT'S NAME: Rosey Tucker  PREFERRED NAME: Rosey  PRONOUNS: she/her/hers    MRN: [id number]  : 1970  ADDRESS: [address] [place], [place] [address]  ACCT. NUMBER:  [id number]  DATE OF SERVICE: 21  START TIME: 1:10 pm, difficulty connecting in MYC at first.  END TIME: 2:17 pm  PREFERRED PHONE: [phone]  May we leave a program related message: Yes  SERVICE MODALITY:  Video Visit:      Provider verified identity through the following two step process.  Patient provided:  Patient  and Patient's last 4 digits of SSN    Telemedicine Visit: The patient's condition can be safely assessed and treated via synchronous audio and visual telemedicine encounter.      Reason for Telemedicine Visit: Patient has requested telehealth visit    Originating Site (Patient Location): her car    Distant Site (Provider Location): Provider Remote Setting- Home Office    Consent:  The patient/guardian has verbally consented to: the potential risks and benefits of telemedicine (video visit) versus in person care; bill my insurance or make self-payment for services provided; and responsibility for payment of non-covered services.     Pt gives verbal consent for AYLEEN to and from:      Herself, Tel:[phone]  Email:[email or web address]    Her Emergency Only Contact, Nydia Oscar(sister), [phone]    Patient would like the video invitation sent by: Texting  [phone] and My Chart    Mode of Communication:  Video Conference via [facility]    As the provider I attest to compliance with applicable laws and regulations related to telemedicine.    UNIVERSAL ADULT Substance Use Disorder DIAGNOSTIC ASSESSMENT    Identifying Information:  Patient is a 51 year old,   The pronoun use throughout this assessment reflects the patient's chosen pronoun.  Patient was referred for an assessment by herself  "and a friend. Patient attended the session alone.     Chief Complaint:   The reason for seeking services at this time is: \" My two daughters, my son, brother, sister, closest friends are all concerned. \"   The problem(s) began \"2 years ago\". Patient has attempted to resolve these concerns in the past through \"her first AA meeting this past week, therapy in the past\".   Patient does not appear to be in severe withdrawal, an imminent safety risk to self or others, or requiring immediate medical attention and may proceed with the assessment interview.    Social/Family History:  Patient reported they grew up in [place], [place].  They were raised by her mother and father.  \"I lost a brother in 2001.  I have a brother and a sister still living\".   Patient reported that their childhood was \"it was pretty good, Yazdanism every week and we did normal stuff like vacations\".  Patient describes current relationships with family of origin as \"good'.      The patient describes their cultural background as White.  Cultural influences and impact on patient's life structure, values, norms, and healthcare: none identified..  Contextual influences on patient's health include: Individual Factors The pt reports experiencing stress and loss in her life.  She is currently in the process of moving to a new home. and Family Factors The pt reports her family has expressed concern about her drinking..  Patient identified their preferred language to be English. Patient reported they does not need the assistance of an  or other support involved in therapy.  Patient reports they are not involved in community of shabbir activities.  They reports spirituality impacts recovery in the following ways:  \"it could be\".     Patient reported had no significant delays in developmental tasks.   Patient's highest education level was associate degree / vocational certificate. Patient identified the following learning problems: none reported.  Patient " "reports they are  able to understand written materials.    Patient reported the following relationship history \" for 12 years and  in 2006\".  Patient's current relationship status is involved for the past couple of years.   Patient identified their sexual orientation as heterosexual.  Patient reported having three child(steph).     Patient's current living/housing situation involves staying in own home/apartment.  They live with her youngest son and a dog and a cat and they report that housing is stable. Patient identified siblings, adult child, pets, friends and current relationship. as part of their support system.  Patient identified the quality of these relationships as good.      Patient reports engaging in the following recreational/leisure activities: \"hike, be outside\".  Patient is currently \" 32 hours a week\".  Patient reports their income is obtained through employment.  Patient does not identify finances as a current stressor.      Patient denies substance related arrests or legal issues.  Patient denies being on probation / parole / under the jurisdiction of the court.      Patient's Strengths and Limitations:  Patient identified the following strengths or resources that will help them succeed in treatment: Spiritism / Yazidi, commitment to health and well being, community involvement, exercise routine, shabbir / spirituality, friends / good social support, family support, insight, intelligence, motivation, sense of humor, sober support group / recovery support , sponsor, strong social skills and work ethic. Things that may interfere with the patient's success in treatment include: none identified.     Personal and Family Medical History:  Patient did report a family history of mental health concerns.  Patient reports family history includes Anxiety Disorder in her mother; Breast Cancer in her paternal grandmother; Depression in her daughter and mother; Mental Illness in her mother; No Known " "Problems in her daughter, maternal aunt, maternal grandfather, maternal grandmother, paternal aunt, paternal grandfather, and sister; Substance Abuse in her father..      Patient reported the following previous mental health diagnoses: medication from her Primary for Depression.  Patient reports their primary mental health symptoms include: \"stress, but it is going to get better.  I am in the middle of moving and my mom has Alzheimer \" and these do impact her ability to function.   Patient has received mental health services in the past: \"therapy, during my divorce, medication\".  Psychiatric Hospitalizations: None.  Patient denies a history of civil commitment.  Current mental health services/providers include:  Pt is interested in a therapist and a psychiatrist.    GAIN-SS Tool:    When was the last time that you had significant problems... 8/12/2021   with feeling very trapped, lonely, sad, blue, depressed or hopeless about the future? Past month   with sleep trouble, such as bad dreams, sleeping restlessly, or falling asleep during the day? Past Month   with feeling very anxious, nervous, tense, scared, panicked or like something bad was going to happen? Past month   with becoming very distressed & upset when something reminded you of the past? Never   with thinking about ending your life or committing suicide? Never     When was the last time that you did the following things 2 or more times? 8/12/2021   Lied or conned to get things you wanted or to avoid having to do something? Never   Had a hard time paying attention at school, work or home? Past month   Had a hard time listening to instructions at school, work or home? Never   Were a bully or threatened other people? Never   Started physical fights with other people? Never       Patient has not had a physical exam to rule out medical causes for current symptoms.  Date of last physical exam was greater than a year ago and client was encouraged to schedule an " "exam with PCP. The patient has a [facility] Care Provider, who is [facility]..  Patient reports no current medical concerns.  Patient denies any issues with pain.. Patient denies pregnancy..  There are significant appetite / nutritional concerns / weight changes, \"low appetite\".  Patient does not report a history of an eating disorder.  Patient does not report a history of head injury / trauma / cognitive impairment.      Patient reports current meds as:   No outpatient medications have been marked as taking for the 8/12/21 encounter (Hospital Encounter) with Merlyn Rao LADC.       Medication Adherence:  Patient reports taking prescribed medications as prescribed.    Patient Allergies:    Allergies   Allergen Reactions     Bee Venom Angioedema     1st reaction 7/30/2020     Seasonal Allergies        Medical History:  History reviewed. No pertinent past medical history.    Rating Scales:    PHQ9:    PHQ-9 SCORE 10/13/2020 8/12/2021   PHQ-9 Total Score 7 9   ;      GAD7:    TRINY-7 SCORE 10/13/2020 8/12/2021   Total Score 6 11       Substance Use:  Patient reported the following biological family members or relatives with chemical health issues: Father reportedly used alcohol .  Patient has not received substance use disorder and/or gambling treatment in the past.  Patient has ever been to detox.  Patient is not currently receiving any chemical dependency treatment. Patient reports they have attended the following support groups: AA in the past.        Chemical use most recent 12 months outside a facility and other significant use history (client self-report)                    X = Primary Drug Used    Age of First Use Most Recent Pattern of Use and Duration   Need enough information to show pattern (both frequency and amounts) and to show tolerance for each chemical that has a diagnosis    Date of last use and time, if needed    Withdrawal Potential? Requiring special care Method " "of use  (oral, smoked, snort, IV, etc)   x    Alcohol       14 Current: 5 times a week, I will drink fireball, wine and beer and drinking about 8 beverages in a day for the past year and a half.  When I was going through around 2006, heavy use, about the same as now\".    Past year: \"every day I have to have 2-3 glasses of wine.\" This has been going on for over a year.     Weekends: drinking a bottle of wine on Friday and Saturday, sometimes on Sunday a couple of beers.  Weekdays: 2-3 glasses of wine. Some days none.     Hard alcohol: some fireball shots once in a while,   Mostly drinking wine.    Saturday August 7, \"3 healthy glasses of wine, 5-6 beers\"  Not at this time  oral        Marijuana/  Hashish    16 Used in college. 25 years ago no smoke        Cocaine/Crack       Has used  \"college, cocaine 15 times\". college  no  Snort          Meth/  Amphetamines    No use                Heroin       No use                Other Opiates/  Synthetics    No use                Inhalants       No use                Benzodiazepines       No use                Hallucinogens       No use                Barbiturates/  Sedatives/  Hypnotics No use                Over-the-Counter Drugs    No use                Caffeine       Age 28  Coffee- \" two cups a day\"   8/12/2021    no   oral        Nicotine       14 Current: some day smoker; generally 1-5 cigarettes per day. 8/12/2021   no smoke     Patient reported the following problems as a result of their substance use: family problems and relationship problems.  Patient is concerned about substance use. Patient reports her kids, friends, siblings are more concerned about their substance use.  Patient reports their recovery goals are \"I feel like I kind of lost myself along the way and I really want to get myself back. I would like to be able to have a glass of wine with my friends, but am not sure I can do that\".    Patient reports experiencing the following withdrawal symptoms within " "the past 12 months: none and the following within the past 30 days: vivid/unpleasant dreams.   Patients reports urges to use Alcohol and tobacco.  Patient reports she has used more Alcohol than intended and over a longer period of time than intended. Patient reports she has not had unsuccessful attempts to cut down or control use of Alcohol.  Patient reports longest period of abstinence was 2 weeks and return to use was due to \"my father passed and having to move my Mom and dealing with my daughter's depression\". Patient reports she has needed to use more Alcohol to achieve the same effect.  Patient does  report diminished effect with use of same amount of Alcohol.     Patient does  report a great deal of time is spent in activities necessary to obtain, use, or recover from Alcohol effects.  Patient does not report important social, occupational, or recreational activities are given up or reduced because of Alcohol use.  Alcohol use is continued despite knowledge of having a persistent or recurrent physical or psychological problem that is likely to have caused or exacerbated by use.  Patient reports the following problem behaviors while under the influence of substances \" my kids just don't talk to me and I have fallen a couple times\".     Patient reports substance use has not ever impacted their ability to function in a school setting. Patient reports substance use has not ever impacted their ability to function in a work setting.  Patients demographics and history impact their recovery in the following ways:  Father abused alcohol.  Patient reports engaging in the following recreation/leisure activities while using:  \"Putzing around the house, packing, cleaning and whenever I am on vacation\".   Patient reports the following people are supportive of recovery: 'friends and family\"    Patient does not have a history of gambling concerns and/or treatment.  Patient does not have other addictive behaviors she is " "concerned about .        Dimension Scale Ratings:    Dimension 1 -  Acute Intoxication/Withdrawal: 0 - No Problem  Dimension 2 - Biomedical: 0 - No Problem  Dimension 3 - Emotional/Behavioral/Cognitive Conditions: 1 - Minor Problem  Dimension 4 - Readiness to Change:  1 - Minor Problem  Dimension 5 - Relapse/Continued Use/ Continued Problem Potential: 4 - Extreme Problem  Dimension 6 - Recovery Environment:  2 - Moderate Problem    Significant Losses / Trauma / Abuse / Neglect Issues:   Patient has not served in the .  There are indications or report of significant loss, trauma, abuse or neglect issues related to: \"When my brother , my father dies, divorce, when my daughter almost comitted suicide and my mom with Alzheimers with hallucinations..  Concerns for possible neglect are not present.     Safety Assessment:   Current Safety Concerns:  [place] Suicide Severity Rating Scale (Lifetime/Recent)  [place] Suicide Severity Rating (Lifetime/Recent) 2021   1. Wish to be Dead (Lifetime) No   1. Wish to be Dead (Recent) No   2. Non-Specific Active Suicidal Thoughts (Lifetime) No   2. Non-Specific Active Suicidal Thoughts (Recent) No   3. Active Suicidal Ideation with any Methods (Not Plan) Without Intent to Act (Lifetime) No   3. Active Suicidal Ideation with any Methods (Not Plan) Without Intent to Act (Recent) No   4. Active Suicidal Ideation with Some Intent to Act, Without Specific Plan (Lifetime) No   4. Active Suicidal Ideation with Some Intent to Act, Without Specific Plan (Recent) No   5. Active Suicidal Ideation with Specific Plan and Intent (Lifetime) No   5. Active Suicidal Ideation with Specific Plan and Intent (Recent) No   Most Severe Ideation Rating (Lifetime) NA   Most Severe Ideation Description (Lifetime) (No Data)   Comments \"none\"   Frequency (Lifetime) NA   Duration (Lifetime) NA   Controllability (Lifetime) NA   Protective Factors  (Lifetime) NA   Reasons for Ideation (Lifetime) " "NA   Most Severe Ideation Rating (Past Month) NA   Most Severe Ideation Description (Past Month) (No Data)   Comments \"none\"   Frequency (Past Month) NA   Duration (Past Month) NA   Controllability (Past Month) NA   Protective Factors (Past Month) NA   Reasons for Ideation (Past Month) NA   Actual Attempt (Lifetime) No   Actual Attempt (Past 3 Months) No   Has subject engaged in non-suicidal self-injurious behavior? (Lifetime) No   Has subject engaged in non-suicidal self-injurious behavior? (Past 3 Months) No   Interrupted Attempts (Lifetime) No   Interrupted Attempts (Past 3 Months) No   Aborted or Self-Interrupted Attempt (Lifetime) No   Aborted or Self-Interrupted Attempt (Past 3 Months) No   Preparatory Acts or Behavior (Lifetime) No   Preparatory Acts or Behavior (Past 3 Months) No   Most Recent Attempt Date (No Data)   Comments \"none\"   Most Recent Attempt Actual Lethality Code NA   Most Lethal Attempt Actual Lethality Code NA   Initial/First Attempt Date (No Data)   Comments \"none\"   Initial/First Attempt Actual Lethality Code NA     Patient denies current homicidal ideation and behaviors.  Patient denies current self-injurious ideation and behaviors.    Patient reported unsafe motor vehicle operation associated with substance use.  Patient denies any high risk behaviors associated with mental health symptoms.  Patient reports the following current concerns for their personal safety: None.  Patient reports there no firearms in the house.      History of Safety Concerns:  Patient denied a history of homicidal ideation.     Patient denied a history of personal safety concerns.    Patient denied a history of assaultive behaviors.    Patient denied a history of sexual assault behaviors.     Patient reported a history unsafe motor vehicle operation associated with substance use.  Patient denies any history of high risk behaviors associated with mental health symptoms.  Patient reports the following protective " factors:      Risk Plan:  See Recommendations for Safety and Risk Management Plan    Review of Symptoms per patient report:  Substance Use:  blackouts, passing out, vomiting, hangovers, daily use, driving under the influence and cravings/urges to use     Diagnostic Criteria:  OP BEH [place] CRITERIA: Substance is often taken in larger amounts or over a longer period than was intended.  Met for:  Alcohol,  A great deal of time is spent in activities necessary to obtain the substance, use the substance, or recover from its effects.  Met for:  Alcohol, Craving, or a strong desire or urge to use the substance.  Met for:  Alcohol and Tobacco, Recurrent use of the substance in which it is physically hazardous.  Met for:  Alcohol, Use of the substance is continued despite knowledge of having a persistent or recurrent physical or psychological problem that is likely to have been cause or exacerbated by the substance.  Met for:  Alcohol and Tobacco, Tolerance:  either a need for markedly increased amounts of the substance to achieve the desired effect or a markedly diminished effect with continued use of the dame amount of the substance.  Met for:  Alcohol    Collateral Contact Summary:   Collateral contacts contributing to this assessment:  None at this time.    If court related records were reviewed, summarize here: NA    Information in this assessment was obtained from the medical record and provided by patient who is a good historian.    Patient will have open access to their mental health medical record.    As evidenced by self report and criteria, client meets the following DSM5 Diagnoses:   (Sustained by DSM5 Criteria Listed Above)  Alcohol Use Disorder   303.90 (F10.20) Severe current.    Recommendations:     1. Plan for Safety and Risk Management:  Recommended that patient call 911 or go to the local ED should there be a change in any of these risk factors..      Report to child / adult protection services was NA.  "    2. [place] Referrals:   Recommendations:        Pt meets criteria for inpatient Substance Use Disorder, but prefers to try Intensive Outpatient first.    Attend Intensive outpatient with [facility] or similar program.    Refrain from using alcohol and any illicit drugs.    Participate in a support group per recommendation of your treatment counselor(resources sent)..    Follow through on securing a psychiatrist for a full mental health evaluation and medication management as well as individual therapy.    Follow all recommendations of your treatment and medical team.    Consider an anti-craving medication from your Primary Care Provider.    Should you be unable to maintain abstinence from alcohol in IOP, consider a higher level of care.     Patient reports they are not willing to follow these recommendations.  Patient would like the following family or other support people involved in their treatment:  TBD. Patient does not have a history of opiate use.    3. Mental Health Referrals:  Pt has the name of a psychiatrist and has been encouraged to have a full mental health evaluation and a referral for individual therapy.     4. Patient's identified no special considerations for tx..     5. Recommendations for treatment focus:   Depressed Mood - per pt.  Anxiety - per pt.  Grief / Loss - loss of her father and brother, daughter's near suicide and mother with Alzheimers with hallucinations.  Alcohol / Substance Use - severe..        Provider Name/ Credentials:  DARIUS España  August 12, 2021                Mary Borjas LADC   Chem Dep   Chemical Dependency   Progress Notes   Signed   Date of Service:  10/13/2020 12:17 PM   Creation Time:  10/13/2020 12:17 PM                 []Hide copied text    []Hover for details  The patient has been notified of the following:      \"We have found that certain health care needs can be provided without the need for a face to face visit.  " "This service lets us provide the care you need with a phone conversation.       I will have full access to your [facility] medical record during this entire phone call.   I will be taking notes for your medical record.      Since this is like an office visit, we will bill your insurance company for this service.       There are potential benefits and risks of telephone visits (e.g. limits to patient confidentiality) that differ from in-person visits.?  Confidentiality still applies for telephone services, and nobody will record the visit.  It is important to be in a quiet, private space that is free of distractions (including cell phone or other devices) during the visit.??      If during the course of the call I believe a telephone visit is not appropriate, you will not be charged for this service\"     Consent has been obtained for this service by care team member: Yes      Start Time: 12:30pm  End Time: 1:44pm     Rule 25 Assessment               Background Information   1. Date of Assessment Request  2. Date of Assessment  10/13/2020 3. Date Service Authorized      4.   [facility] M[facility]     5.  Phone Number   [phone] 6. Referent  Self 7. Assessment Site  [facility]      8. Client Name   Rosey Tucker 9. Date of Birth  1970 Age  50 year old 10. Gender  female  11. PMI/ Insurance No.  [id number]   12. Client's Primary Language:  English 13. Do you require special accommodations, such as an  or assistance with written material? No   14. Current Address: [address] [place] [address]   15. Client Phone Numbers: [phone]       16. Tell me what has happened to bring you here today.     \"my son looked up this number and said I needed this assessment.\"      17. Have you had other rule 25 assessments?      No     DIMENSION I - Acute Intoxication /Withdrawal Potential   1. Chemical use most recent 12 months outside a " "facility and other significant use history (client self-report)                    X = Primary Drug Used    Age of First Use Most Recent Pattern of Use and Duration   Need enough information to show pattern (both frequency and amounts) and to show tolerance for each chemical that has a diagnosis    Date of last use and time, if needed    Withdrawal Potential? Requiring special care Method of use  (oral, smoked, snort, IV, etc)   x    Alcohol       14 Past year: \"every day I have to have 2-3 glasses of wine.\" This has been going on for over a year.     Weekends: drinking a bottle of wine on Friday and Saturday, sometimes on Sunday a couple of beers.  Weekdays: 2-3 glasses of wine. Some days none.     Hard alcohol: some fireball shots once in a while,   Mostly drinking wine.    10/12/20  2 large glasses of wine   oral        Marijuana/  Hashish    16 Used in college. 25 years ago no smoke        Cocaine/Crack       No use                Meth/  Amphetamines    No use                Heroin       No use                Other Opiates/  Synthetics    No use                Inhalants       No use                Benzodiazepines       No use                Hallucinogens       No use                Barbiturates/  Sedatives/  Hypnotics No use                Over-the-Counter Drugs    No use                Other       No use                Nicotine       14 Current: some day smoker; generally 1-5 cigarettes per day. 10/13/20 no smoke      2. Do you use greater amounts of alcohol/other drugs to feel intoxicated or achieve the desired effect?  Yes.  Or use the same amount and get less of an effect?  Yes.  Example: The patient reported having increased use and tolerance issues with alcohol.     3A. Have you ever been to detox?      No     3B. When was the first time?      The patient denied ever having a detoxification admission.     3C. How many times since then?      The patient denied ever having a detoxification admission.     3D. " Date of most recent detox:      The patient denied ever having a detoxification admission.     4.  Withdrawal symptoms: Have you had any of the following withdrawal symptoms?  Past 12 months Recent (past 30 days)   Headache  Fatigue / Extremely Tired  Sad / Depressed Feeling Headache  Fatigue / Extremely Tired  Sad / Depressed Feeling      's Visual Observations and Symptoms: No visible withdrawal symptoms at this time     Based on the above information, is withdrawal likely to require attention as part of treatment participation?  No     Dimension I Ratings   Acute intoxication/Withdrawal potential - The placing authority must use the criteria in Dimension I to determine a client s acute intoxication and withdrawal potential.    RISK DESCRIPTIONS - Severity ratin Client displays full functioning with good ability to tolerate and cope with withdrawal discomfort. No signs or symptoms of intoxication or withdrawal or resolving signs or symptoms.     REASONS SEVERITY WAS ASSIGNED (What about the amount of the person s use and date of most recent use and history of withdrawal problems suggests the potential of withdrawal symptoms requiring professional assistance? )      Patient reports that her last use of alcohol was on 10/12/2020.  Pt denies having any feelings of withdrawal.             DIMENSION II - Biomedical Complications and Conditions   1a. Do you have any current health/medical conditions?(Include any infectious diseases, allergies, or chronic or acute pain, history of chronic conditions)        Acid reflux.     1b. On a scale of mild, moderate to severe please specify the severity of the patient's diabetes and/or neuropathy.     The patient denied having a history of being diagnosed with diabetes or neuropathy.     2. Do you have a health care provider? When was your most recent appointment? What concerns were identified?      The patient's PCP is Dr. Prabha Mata.  The patient's Primary  Medical Clinic is [facility] in [facility].  The patient's last medical appointment was a couple of months ago.     3. If indicated by answers to items 1 or 2: How do you deal with these concerns? Is that working for you? If you are not receiving care for this problem, why not?       The patient reported taking prescription medications as prescribed for the above medical issues.     4A. List current medication(s) including over-the-counter or herbal supplements--including pain management:      pantoprazole (?)  Generic depression med     4B. Do you follow current medical recommendations/take medications as prescribed?      Yes     4C. When did you last take your medication?      10/12/20     4D. Do you need a referral to have a follow up with a primary care physician?     No.     5. Has a health care provider/healer ever recommended that you reduce or quit alcohol/drug use?      No     6. Are you pregnant?      No     7. Have you had any injuries, assaults/violence towards you, accidents, health related issues, overdose(s) or hospitalizations related to your use of alcohol or other drugs:      No     8. Do you have any specific physical needs/accommodations? No     Dimension II Ratings   Biomedical Conditions and Complications - The placing authority must use the criteria in Dimension II to determine a client s biomedical conditions and complications.   RISK DESCRIPTIONS - Severity ratin Client displays full functioning with good ability to cope with physical discomfort.     REASONS SEVERITY WAS ASSIGNED (What physical/medical problems does this person have that would inhibit his or her ability to participate in treatment? What issues does he or she have that require assistance to address?)     Patient denies having any chronic biomedical conditions that would interfere with treatment or any recovery skills training/workshop. Pt reports having acid reflux and takes pantoprazole for it.  Pt denies having pain at  "this time and reports her pain level is a 0 on the 0-10 Pain Rating Scale. Pt reports that she is a daily cigarette smoker.            DIMENSION III - Emotional, Behavioral, Cognitive Conditions and Complications   1. (Optional) Tell me what it was like growing up in your family. (substance use, mental health, discipline, abuse, support)      \"fairly normal. My parents clint, but not all the time. Raised [employer]ism.\"  Mom- depression, anxiety, and dementia.      2. When was the last time that you had significant problems...  A. with feeling very trapped, lonely, sad, blue, depressed or hopeless  about the future? Past Month- \"Covid is not helping at all.\"      B. with sleep trouble, such as bad dreams, sleeping restlessly, or falling  asleep during the day? Past Month- hard time falling asleep.     C. with feeling very anxious, nervous, tense, scared, panicked, or like  something bad was going to happen? Never     D. with becoming very distressed and upset when something reminded  you of the past? Never     E. with thinking about ending your life or committing suicide? Never     3. When was the last time that you did the following things two or more times?  A. Lied or conned to get things you wanted or to avoid having to do  something? Never     B. Had a hard time paying attention at school, work, or home? Never     C. Had a hard time listening to instructions at school, work, or home? Never     D. Were a bully or threatened other people? Never     E. Started physical fights with other people? Never     Note: These questions are from the Global Appraisal of Individual Needs--Short Screener. Any item marked  past month  or  2 to 12 months ago  will be scored with a severity rating of at least 2.      For each item that has occurred in the past month or past year ask follow up questions to determine how often the person has felt this way or has the behavior occurred? How recently? How has it affected their daily " "living? And, whether they were using or in withdrawal at the time?     See above     4A. If the person has answered item 2E with  in the past year  or  the past month , ask about frequency and history of suicide in the family or someone close and whether they were under the influence.      Not in the past year     Any history of suicide in your family? Or someone close to you?      \"my daughter attempted. That lingers a bit.\"     4B. If the person answered item 2E  in the past month  ask about  intent, plan, means and access and any other follow-up information  to determine imminent risk. Document any actions taken to intervene  on any identified imminent risk.       The patient denied having any suicide ideation within the past month.     5A. Have you ever been diagnosed with a mental health problem?      Yes, explain: \"depression\"       5B. Are you receiving care for any mental health issues? If yes, what is the focus of that care or treatment?  Are you satisfied with the service? Most recent appointment?  How has it been helpful?      In the past she has worked with a therapist. She started E[facility] last week.     6. Have you been prescribed medications for emotional/psychological problems?     Yes, a generic depression medication.     7. Does your MH provider know about your use?      Yes.  7B. What does he or she have to say about it?(DSM) \"she basically said pay attention and ask yourself why do you need that second glass.\"     8A. Have you ever been verbally, emotionally, physically or sexually abused?      No      Follow up questions to learn current risk, continuing emotional impact.       The patient denied having any history of being verbally, emotionally, physically or sexually abused.     8B. Have you received counseling for abuse?       The patient denied having any history of being verbally, emotionally, physically or sexually abused.     9. Have you ever experienced or been part of a group that " experienced community violence, historical trauma, rape or assault?      No     10A. :     No     11. Do you have problems with any of the following things in your daily life?     Problem Solving and Remembering       Note: If the person has any of the above problems, follow up with items 12, 13, and 14. If none of the issues in item 11 are a problem for the person, skip to item 15.     The patient would benefit from developing sober coping skills.     12. Have you been diagnosed with traumatic brain injury or Alzheimer s?  No     13. If the answer to #12 is no, ask the following questions:     Have you ever hit your head or been hit on the head? No     Were you ever seen in the Emergency Room, hospital or by a doctor because of an injury to your head? No     Have you had any significant illness that affected your brain (brain tumor, meningitis, West Nile Virus, stroke or seizure, heart attack, near drowning or near suffocation)? No     14. If the answer to #12 is yes, ask if any of the problems identified in #11 occurred since the head injury or loss of oxygen. The patient had never had a head injury or a loss of oxygen.     15A. Highest grade of school completed:      Associate degree/vocational certificate     15B. Do you have a learning disability? No     15C. Did you ever have tutoring in Math or English? No     15D. Have you ever been diagnosed with Fetal Alcohol Effects or Fetal Alcohol Syndrome? No     16. If yes to item 15 B, C, or D: How has this affected your use or been affected by your use?      The patient denied having any history of a learning disability, tutoring in math or English or being diagnosed with Fetal Alcohol Effects or Fetal Alcohol Syndrome.     Dimension III Ratings   Emotional/Behavioral/Cognitive - The placing authority must use the criteria in Dimension III to determine a client s emotional, behavioral, and cognitive conditions and complications.   RISK DESCRIPTIONS - Severity  "ratin Client has difficulty with impulse control and lacks coping skills. Client has thoughts of suicide or harm to others without means; however, the thoughts may interfere with participation in some treatment activities. Client has difficulty functioning in significant life areas. Client has moderate symptoms of emotional, behavioral, or cognitive problems. Client is able to participate in most treatment activities.     REASONS SEVERITY WAS ASSIGNED - What current issues might with thinking, feelings or behavior pose barriers to participation in a treatment program? What coping skills or other assets does the person have to offset those issues? Are these problems that can be initially accommodated by a treatment provider? If not, what specialized skills or attributes must a provider have?     The patient reports having mental health diagnosis of depression. Pt is taking a generic depression medication for her mental health at this time. She started seeing a [facility] counselor last week. Pt denies a history of abuse. At the time of the assessment, pt's PHQ-9 score was 7 (mild depression) and her TRINY-7 score was 6 (mild anxiety). Pt denies SIB, SA, suicidal thoughts at this time. During pt's assessment, her [place]-Suicide Severity Rating Scale score was 0. - Very Low Risk.            DIMENSION IV - Readiness for Change   1. You ve told me what brought you here today. (first section) What do you think the problem really is?      \"I don't know. I suppose not being able to stop after that first, second glass of wine. I try to ignore all the negative. Not being able to keep up.\" Drinking to numb/block out things that are going on.     2. Tell me how things are going. Ask enough questions to determine whether the person has use related problems or assets that can be built upon in the following areas: Family/friends/relationships; Legal; Financial; Emotional; Educational; Recreational/ leisure; Vocational/employment; " "Living arrangements (DSM)       The patient currently lives with her 16 year old son.  The patient denied having any concerns regarding her immediate living environment or neighborhood. The patient reported having a relationship conflict with her children due to her ongoing alcohol abuse issues. The patient identified as being heterosexual and she reports she is in a romantic relationship with her boyfriend for the past 3 years. The patient denied having a history of legal issues. 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 The patient lacks a current sober peer support network.     3. What activities have you engaged in when using alcohol/other drugs that could be hazardous to you or others (i.e. driving a car/motorcycle/boat, operating machinery, unsafe sex, sharing needles for drugs or tattoos, etc      The patient denied engaging in any of the above dangerous activities when using alcohol and/or drugs.     4. How much time do you spend getting, using or getting over using alcohol or drugs? (DSM)      Weekdays: \"after work, about 5.\" She'd have her last glass at \"probably 9, maybe 10\"pm.  Weekends: \"probably around the same time, maybe 4 if I am home. I stay up a little bit later, maybe 11:30pm.\"     5. Reasons for drinking/drug use (Use the space below to record answers. It may not be necessary to ask each item.)  Like the feeling Yes   Trying to forget problems Yes   To cope with stress Yes   To relieve physical pain No   To cope with anxiety No   To cope with depression Yes   To relax or unwind Yes   Makes it easier to talk with people sometimes   Partner encourages use No   Most friends drink or use Yes   To cope with family problems No   Afraid of withdrawal symptoms/to feel better No   Other (specify)  No      A. What concerns other people about your alcohol or drug use/Has anyone told you that you use too much? What did they say? (DSM)      Children's concerns: \"I don't " "know I guess. They want me to be healthy.\" Her 16 year old son wanted her to complete this assessment.     B. When did you first think you had a problem with drugs or alcohol?     Alcohol: \"probably a year ago when it started getting a little bit crazy.\"     6. What changes are you willing to make? What substance are you willing to stop using? How are you going to do that? Have you tried that before? What interfered with your success with that goal?       What specific changes are you willing to make? \"I guess I would really like to be able to keep the drinking to socializing on weekends. Even then, it wouldn't have to be every weekend.\"  Why do you want to make this change? \"just to be a better mom again, the respect of my kids.\"  What are you willing to commit to in order to make this change? \"I don't know. Whatever I have to do.\"  What is getting in your way to make this change? \"just me.\"     7. What would be helpful to you in making this change?      \"I don't know. I honestly think I can do it. I just need to come up with some [id number]. Maybe some Bible sentences or verses. Meditation. I don't think I have time to do it. I would love to go to [employer], but you can't because of covid.\"     Dimension IV Ratings   Readiness for Change - The placing authority must use the criteria in Dimension IV to determine a client s readiness for change.   RISK DESCRIPTIONS - Severity ratin Client is motivated with active reinforcement, to explore treatment and strategies for change, but ambivalent about illness or need for change.     REASONS SEVERITY WAS ASSIGNED - (What information did the person provide that supports your assessment of his or her readiness to change? How aware is the person of problems caused by continued use? How willing is she or he to make changes? What does the person feel would be helpful? What has the person been able to do without help?)       Pt's 16 year old son asked her to complete this CD " "evaluation.  She thought she had a problem with alcohol about a year ago when she began drinking more. When it comes to her drinking, \"I just want to be able to control it. I don't know if that\" she wants to stop drinking. She is open to attending programming at [facility] if insurance covers the program. She is also open to working with a psychotherapist who is well versed with addiction.              DIMENSION V - Relapse, Continued Use, and Continued Problem Potential   1A. In what ways have you tried to control, cut-down or quit your use? If you have had periods of sobriety, how did you accomplish that? What was helpful? What happened to prevent you from continuing your sobriety? (DSM)      Control: \"I guess I try to tell myself that I don't need it. Go for a walk.\"  Longest sober time: 4 days.  How did you do it: \"I just didn't stop and get a bottle of wine. I made myself drive right home.\"  Why did you relapse? \"because it was the weekend.\"  What are your reasons for using? \"some times I honestly I enjoy cleaning and having a glass of wine. Cooking and having a glass of wine. putzing around...\"     1B. What were the circumstances of your most recent relapse with mood altering chemicals?     She drank because it was the weekend.     2. Have you experienced cravings? If yes, ask follow up questions to determine if the person recognizes triggers and if the person has had any success in dealing with them.      In the past 30 days, on a scale of 0-10 (0 least severe to 10 being most severe), how would you rate your cravings?  Alcohol: 6     3. Have you been treated for alcohol/other drug abuse/dependence? No     4. Support group participation: Have you/do you attend support group meetings to reduce/stop your alcohol/drug use? How recently? What was your experience? Are you willing to restart? If the person has not participated, is he or she willing?      She has never attended before. She reports she thought " "about it, but didn't go.     5. What would assist you in staying sober/straight?      \"I just want to be able to control it. I don't know if that\" she wants to stop drinking.     Dimension V Ratings   Relapse/Continued Use/Continued problem potential - The placing authority must use the criteria in Dimension V to determine a client s relapse, continued use, and continued problem potential.   RISK DESCRIPTIONS - Severity ratin (A) Client has minimal recognition and understanding of relapse and recidivism issues and displays moderate vulnerability for further substance use or mental health problems. (B) Client has some coping skills inconsistently applied.     REASONS SEVERITY WAS ASSIGNED - (What information did the person provide that indicates his or her understanding of relapse issues? What about the person s experience indicates how prone he or she is to relapse? What coping skills does the person have that decrease relapse potential?)       Pt denies having ever attended a cd treatment program, a detox program, or a 12 step meeting before. Pt lacks education into her disease of addiction. In the past 30 days, pt rates her cravings for alcohol as a 6 on the 0-10 Craving Scale. Pt identified her biggest reasons to drink is \"some times I honestly I enjoy cleaning and having a glass of wine. Cooking and having a glass of wine. putzing around...\" Pt displays moderate vulnerability for further substance use.            DIMENSION VI - Recovery Environment   1. Are you employed/attending school? Tell me about that.      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  Work hours: she works 8 hour shifts, 3 days a week- Monday, Wednesday, and Thursday and 2 half shifts. She usually works 9-5pm. \"In home care it is on and off.\"     2A. Describe a typical day; evening for you. Work, school, social, leisure, volunteer, spiritual practices. Include time spent obtaining, using, recovering " "from drugs or alcohol. (DSM)      \"I start with a morning huddle. My mornings are not routine, but I would like to get to that. I start with that and then let the dog out. Gather my work stuff, make a few phone calls, head out on the road. Laundry. My days are not typical, they are all different.\"     Hobbies: \"I don't have any right now. I used to run. I like hiking. I did go for a hike a couple of weeks ago.\"     Please describe what leisure activities have been associated with your substance abuse:     Cooking, cleaning     2B. How often do you spend more time than you planned using or use more than you planned? (DSM)      \"on the weekend for sure. And some nights during the week.\"     3. How important is using to your social connections? Do many of your family or friends use?      Friends: 98% drink     4A. Are you currently in a significant relationship?      Yes.  4B. How long? 3 years            Please describe your significant other's use of mood altering chemicals? He doesn't drink often. \"we will go out and hit some breweries.\"     4C. Sexual Orientation:      Heterosexual     5A. Who do you live with?       Her 16 year old son.     5B. Tell me about their alcohol/drug use and mental health issues.      none     5C. Are you concerned for your safety there? No     5D. Are you concerned about the safety of anyone else who lives with you? No     6A. Do you have children who live with you?      Yes- 16 year old son     6B. Do you have children who do not live with you?      Yes- 2 daughters. One in college and the other is 22.     7A. Who supports you in making changes in your alcohol or drug use? What are they willing to do to support you? Who is upset or angry about you making changes in your alcohol or drug use? How big a problem is this for you?       \"2 best friends. My kids.\"     7B. This table is provided to record information about the person s relationships and available support It is not necessary to " ask each item; only to get a comprehensive picture of their support system.      How often can you count on the following people when you need someone?   Partner / Spouse Usually supportive   Parent(s)/Aunt(s)/Uncle(s)/Grandparents Rarely supportive- mom has dementia    Sibling(s)/Cousin(s) Usually supportive   Child(steph) Usually supportive   Other relative(s) Usually supportive   Friend(s)/neighbor(s) Usually supportive   Child(steph) s father(s)/mother(s) Always supportive   Support group member(s) The patient denied having any current involvement with 12-step or other support group meetings.   Community of shabbir members The patient denied having any current involvement with community shabbir members.   /counselor/therapist/healer Always supportive   Other (specify) No      8A. What is your current living situation?      She has lived in the house she owns since 2002.     8B. What is your long term plan for where you will be living?      No changes.     8C. Tell me about your living environment/neighborhood? Ask enough follow up questions to determine safety, criminal activity, availability of alcohol and drugs, supportive or antagonistic to the person making changes.       No concerns.     9. Criminal justice history: Gather current/recent history and any significant history related to substance use--Arrests? Convictions? Circumstances? Alcohol or drug involvement? Sentences? Still on probation or parole? Expectations of the court? Current court order? Any sex offenses - lifetime? What level? (DSM)     None     10. What obstacles exist to participating in treatment? (Time off work, childcare, funding, transportation, pending alf time, living situation)      The patient denied having any obstacles for participating in substance abuse treatment.     Dimension VI Ratings   Recovery environment - The placing authority must use the criteria in Dimension VI to determine a client s recovery environment.   RISK  DESCRIPTIONS - Severity ratin Client has passive social  or family and significant other are not interested in the client's recovery. The client is engaged in structured meaningful activity.     REASONS SEVERITY WAS ASSIGNED - (What support does the person have for making changes? What structure/stability does the person have in his or her daily life that will increase the likelihood that changes can be sustained? What problems exist in the person s environment that will jeopardize getting/staying clean and sober?)      The patient currently lives with her 16 year old son.  The patient denied having any concerns regarding her immediate living environment or neighborhood. The patient reported having a relationship conflict with her children due to her ongoing alcohol abuse issues. The patient identified as being heterosexual and she reports she is in a romantic relationship with her boyfriend for the past 3 years. The patient denied having a history of legal issues. 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 The patient lacks a current sober peer support network.            Client Choice/Exceptions   Would you like services specific to language, age, gender, culture, Quaker preference, race, ethnicity, sexual orientation or disability?  No     What particular treatment choices and options would you like to have? None     Do you have a preference for a particular treatment program? None     Criteria for Diagnosis      Criteria for Diagnosis  DSM-5 Criteria for Substance Use Disorder  Instructions: Determine whether the client currently meets the criteria for Substance Use Disorder using the diagnostic criteria in the DSM-V pp.481-587. Current means during the most recent 12 months outside a facility that controls access to substances     Category of Substance Severity (ICD-10 Code / DSM 5 Code)      Alcohol Use Disorder Severe  (10.20) (303.90)   Cannabis Use  Disorder The patient does not meet the criteria for a Cannabis use disorder.   Hallucinogen Use Disorder The patient does not meet the criteria for a Hallucinogen use disorder.   Inhalant Use Disorder The patient does not meet the criteria for an Inhalant use disorder.   Opioid Use Disorder The patient does not meet the criteria for an Opioid use disorder.   Sedative, Hypnotic, or Anxiolytic Use Disorder The patient does not meet the criteria for a Sedative/Hypnotic use disorder.   Stimulant Related Disorder The patient does not meet the criteria for a Stimulant use disorder.   Tobacco Use Disorder Mild    (Z72.0) (305.1)   Other (or unknown) Substance Use Disorder The patient does not meet the criteria for a Other (or unknown) Substance use disorder.         Collateral Contact Summary   Number of contacts made: 1     Contact with referring person:  No     If court related records were reviewed, summarize here: No court records had been reviewed at the time of this documentation.     Information from collateral contacts supported/largely agreed with information from the client and associated risk ratings.        Rule 25 Assessment Summary and Plan   's Recommendation     1)  Complete the OP program at [facility] if covered by insurance.  2)  If OP at [facility] is not covered by insurance, please attend weekly individual psychotherapy that addresses addiction.  3)  Consider attending weekly support group meetings, such as 12 step based (AA/NA), SMART Recovery, [facility], and/or [facility] meetings.     4)  Follow all the recommendations of your treatment/medical providers.        Collateral Contacts      Name:     [facility]    Relationship:     [facility]    Phone Number:     NA Releases:     NA      The patient's medical record at [facility] was reviewed and the information contained in the medical record supported the patient's account of her chemical use history and  chemical use consequences.     ollateral Contacts       A problematic pattern of alcohol/drug use leading to clinically significant impairment or distress, as manifested by at least two of the following, occurring within a 12-month period:     1.) Alcohol/drug is often taken in larger amounts or over a longer period than was intended.  2.) There is a persistent desire or unsuccessful efforts to cut down or control alcohol/drug use  3.) A great deal of time is spent in activities necessary to obtain alcohol, use alcohol, or recover from its effects.  4.) Craving, or a strong desire or urge to use alcohol/drug  5.) Recurrent alcohol/drug use resulting in a failure to fulfill major role obligations at work, school or home.  6.) Continued alcohol use despite having persistent or recurrent social or interpersonal problems caused or exacerbated by the effects of alcohol/drug.  10.) Tolerance, as defined by either of the following: A need for markedly increased amounts of alcohol/drug to achieve intoxication or desired effect.  11.) Withdrawal, as manifested by either of the following: The characteristic withdrawal syndrome for alcohol/drug (refer to Criteria A and B of the criteria set for alcohol/drug withdrawal).        Specify if: In early remission:  After full criteria for alcohol/drug use disorder were previously met, none of the criteria for alcohol/drug use disorder have been met for at least 3 months but for less than 12 months (with the exception that Criterion A4,  Craving or a strong desire or urge to use alcohol/drug  may be met).      In sustained remission:   After full criteria for alcohol use disorder were previously met, non of the criteria for alcohol/drug use disorder have been met at any time during a period of 12 months or longer (with the exception that Criterion A4,  Craving or strong desire or urge to use alcohol/drug  may be met).   Specify if:   This additional specifier is used if the  individual is in an environment where access to alcohol is restricted.     Mild: Presence of 2-3 symptoms  Moderate: Presence of 4-5 symptoms  Severe: Presence of 6 or more symptoms

## 2021-08-13 ENCOUNTER — HOSPITAL ENCOUNTER (OUTPATIENT)
Dept: MAMMOGRAPHY | Facility: CLINIC | Age: 51
Discharge: HOME | End: 2021-08-13
Attending: PHYSICIAN ASSISTANT | Admitting: PHYSICIAN ASSISTANT
Payer: COMMERCIAL

## 2021-08-13 DIAGNOSIS — Z12.31 ENCOUNTER FOR SCREENING MAMMOGRAM FOR BREAST CANCER: ICD-10-CM

## 2021-08-13 PROCEDURE — 77067 SCR MAMMO BI INCL CAD: CPT

## 2021-08-13 ASSESSMENT — ANXIETY QUESTIONNAIRES: GAD7 TOTAL SCORE: 11

## 2021-08-14 ENCOUNTER — HEALTH MAINTENANCE LETTER (OUTPATIENT)
Age: 51
End: 2021-08-14

## 2021-10-09 ENCOUNTER — HEALTH MAINTENANCE LETTER (OUTPATIENT)
Age: 51
End: 2021-10-09

## 2021-12-08 ENCOUNTER — HOSPITAL ENCOUNTER (EMERGENCY)
Facility: HOSPITAL | Age: 51
Discharge: HOME | End: 2021-12-08
Attending: EMERGENCY MEDICINE | Admitting: EMERGENCY MEDICINE
Payer: COMMERCIAL

## 2021-12-08 ENCOUNTER — APPOINTMENT (OUTPATIENT)
Dept: CT IMAGING | Facility: HOSPITAL | Age: 51
End: 2021-12-08
Attending: STUDENT IN AN ORGANIZED HEALTH CARE EDUCATION/TRAINING PROGRAM
Payer: COMMERCIAL

## 2021-12-08 VITALS
TEMPERATURE: 97.7 F | DIASTOLIC BLOOD PRESSURE: 98 MMHG | RESPIRATION RATE: 16 BRPM | HEART RATE: 80 BPM | WEIGHT: 130 LBS | BODY MASS INDEX: 23.03 KG/M2 | SYSTOLIC BLOOD PRESSURE: 165 MMHG | OXYGEN SATURATION: 99 %

## 2021-12-08 DIAGNOSIS — K52.9 NON-SPECIFIC COLITIS: ICD-10-CM

## 2021-12-08 LAB
ABO/RH(D): NORMAL
ALBUMIN SERPL-MCNC: 4.4 G/DL (ref 3.5–5)
ALP SERPL-CCNC: 88 U/L (ref 45–120)
ALT SERPL W P-5'-P-CCNC: 33 U/L (ref 0–45)
ANION GAP SERPL CALCULATED.3IONS-SCNC: 13 MMOL/L (ref 5–18)
ANTIBODY SCREEN: NEGATIVE
AST SERPL W P-5'-P-CCNC: 32 U/L (ref 0–40)
BASOPHILS # BLD AUTO: 0 10E3/UL (ref 0–0.2)
BASOPHILS NFR BLD AUTO: 0 %
BILIRUB DIRECT SERPL-MCNC: 0.3 MG/DL
BILIRUB SERPL-MCNC: 1 MG/DL (ref 0–1)
BUN SERPL-MCNC: 11 MG/DL (ref 8–22)
C REACTIVE PROTEIN LHE: 1.2 MG/DL (ref 0–0.8)
CALCIUM SERPL-MCNC: 10.2 MG/DL (ref 8.5–10.5)
CHLORIDE BLD-SCNC: 103 MMOL/L (ref 98–107)
CO2 SERPL-SCNC: 23 MMOL/L (ref 22–31)
CREAT SERPL-MCNC: 0.74 MG/DL (ref 0.6–1.1)
EOSINOPHIL # BLD AUTO: 0 10E3/UL (ref 0–0.7)
EOSINOPHIL NFR BLD AUTO: 0 %
ERYTHROCYTE [DISTWIDTH] IN BLOOD BY AUTOMATED COUNT: 11.9 % (ref 10–15)
GFR SERPL CREATININE-BSD FRML MDRD: >90 ML/MIN/1.73M2
GLUCOSE BLD-MCNC: 104 MG/DL (ref 70–125)
HCT VFR BLD AUTO: 45.9 % (ref 35–47)
HGB BLD-MCNC: 15.9 G/DL (ref 11.7–15.7)
IMM GRANULOCYTES # BLD: 0 10E3/UL
IMM GRANULOCYTES NFR BLD: 0 %
LACTATE SERPL-SCNC: 1.6 MMOL/L (ref 0.7–2)
LIPASE SERPL-CCNC: 28 U/L (ref 0–52)
LYMPHOCYTES # BLD AUTO: 2.1 10E3/UL (ref 0.8–5.3)
LYMPHOCYTES NFR BLD AUTO: 23 %
MCH RBC QN AUTO: 34.7 PG (ref 26.5–33)
MCHC RBC AUTO-ENTMCNC: 34.6 G/DL (ref 31.5–36.5)
MCV RBC AUTO: 100 FL (ref 78–100)
MONOCYTES # BLD AUTO: 0.9 10E3/UL (ref 0–1.3)
MONOCYTES NFR BLD AUTO: 10 %
NEUTROPHILS # BLD AUTO: 6 10E3/UL (ref 1.6–8.3)
NEUTROPHILS NFR BLD AUTO: 67 %
NRBC # BLD AUTO: 0 10E3/UL
NRBC BLD AUTO-RTO: 0 /100
PLATELET # BLD AUTO: 251 10E3/UL (ref 150–450)
POTASSIUM BLD-SCNC: 3.5 MMOL/L (ref 3.5–5)
PROT SERPL-MCNC: 7.8 G/DL (ref 6–8)
RBC # BLD AUTO: 4.58 10E6/UL (ref 3.8–5.2)
SODIUM SERPL-SCNC: 139 MMOL/L (ref 136–145)
SPECIMEN EXPIRATION DATE: NORMAL
WBC # BLD AUTO: 9.1 10E3/UL (ref 4–11)

## 2021-12-08 PROCEDURE — 36415 COLL VENOUS BLD VENIPUNCTURE: CPT | Performed by: STUDENT IN AN ORGANIZED HEALTH CARE EDUCATION/TRAINING PROGRAM

## 2021-12-08 PROCEDURE — 85025 COMPLETE CBC W/AUTO DIFF WBC: CPT | Performed by: STUDENT IN AN ORGANIZED HEALTH CARE EDUCATION/TRAINING PROGRAM

## 2021-12-08 PROCEDURE — 96360 HYDRATION IV INFUSION INIT: CPT | Mod: 59

## 2021-12-08 PROCEDURE — 82248 BILIRUBIN DIRECT: CPT | Performed by: STUDENT IN AN ORGANIZED HEALTH CARE EDUCATION/TRAINING PROGRAM

## 2021-12-08 PROCEDURE — 96361 HYDRATE IV INFUSION ADD-ON: CPT

## 2021-12-08 PROCEDURE — 250N000011 HC RX IP 250 OP 636: Performed by: STUDENT IN AN ORGANIZED HEALTH CARE EDUCATION/TRAINING PROGRAM

## 2021-12-08 PROCEDURE — 83605 ASSAY OF LACTIC ACID: CPT | Performed by: STUDENT IN AN ORGANIZED HEALTH CARE EDUCATION/TRAINING PROGRAM

## 2021-12-08 PROCEDURE — 86901 BLOOD TYPING SEROLOGIC RH(D): CPT | Performed by: STUDENT IN AN ORGANIZED HEALTH CARE EDUCATION/TRAINING PROGRAM

## 2021-12-08 PROCEDURE — 74177 CT ABD & PELVIS W/CONTRAST: CPT

## 2021-12-08 PROCEDURE — 258N000003 HC RX IP 258 OP 636: Performed by: STUDENT IN AN ORGANIZED HEALTH CARE EDUCATION/TRAINING PROGRAM

## 2021-12-08 PROCEDURE — 99285 EMERGENCY DEPT VISIT HI MDM: CPT | Mod: 25

## 2021-12-08 PROCEDURE — 86141 C-REACTIVE PROTEIN HS: CPT | Performed by: STUDENT IN AN ORGANIZED HEALTH CARE EDUCATION/TRAINING PROGRAM

## 2021-12-08 PROCEDURE — 83690 ASSAY OF LIPASE: CPT | Performed by: STUDENT IN AN ORGANIZED HEALTH CARE EDUCATION/TRAINING PROGRAM

## 2021-12-08 RX ORDER — DICYCLOMINE HCL 20 MG
20 TABLET ORAL ONCE
Status: DISCONTINUED | OUTPATIENT
Start: 2021-12-08 | End: 2021-12-08 | Stop reason: HOSPADM

## 2021-12-08 RX ORDER — IOPAMIDOL 755 MG/ML
100 INJECTION, SOLUTION INTRAVASCULAR ONCE
Status: COMPLETED | OUTPATIENT
Start: 2021-12-08 | End: 2021-12-08

## 2021-12-08 RX ORDER — LEVOFLOXACIN 500 MG/1
500 TABLET, FILM COATED ORAL DAILY
Qty: 7 TABLET | Refills: 0 | Status: SHIPPED | OUTPATIENT
Start: 2021-12-08 | End: 2021-12-15

## 2021-12-08 RX ADMIN — IOPAMIDOL 100 ML: 755 INJECTION, SOLUTION INTRAVENOUS at 18:32

## 2021-12-08 RX ADMIN — SODIUM CHLORIDE 1000 ML: 9 INJECTION, SOLUTION INTRAVENOUS at 16:54

## 2021-12-08 NOTE — ED TRIAGE NOTES
Pt experiencing rectal bleeding and loose stools over past 30 hours. Some abdominal pain/cramping. No hx of GI issues or abdominal surgery. Never had colonscopy.

## 2021-12-08 NOTE — ED PROVIDER NOTES
"ED Provider In Triage Note  [facility]  Encounter Date: Dec 8, 2021    Chief Complaint   Patient presents with     Diarrhea     Rectal Bleeding       Brief HPI:   Rosey Tucker is a 51 year old female presenting to the Emergency Department with a chief complaint of bloody diarrhea for the past 3 hours. Describes them as \"pure blood\", bright red. Denies rectal pain. Endorses some crampy abdominal pain right before defecation. Denies history of similar bloody stools in the past. Never had a colonoscopy. Not on blood thinners.     Brief Physical Exam:  There were no vitals taken for this visit.  General: Non-toxic appearing  HEENT: Atraumatic  Resp: No respiratory distress  Abdomen: Non-peritoneal  Neuro: Alert, oriented, answers questions appropriately  Psych: Behavior appropriate    Plan Initiated in Triage:  CT, labs, bentyl      PIT Dispo:   Return to lobby while awaiting workup and ED bed availability    Keaton Carlson MD on 12/8/2021 at 2:45 PM    Patient was evaluated by the Physician in Triage due to a limitation of available rooms in the Emergency Department. A plan of care was discussed based on the information obtained on the initial evaluation and patient was consuled to return back to the Emergency Department lobby after this initial evalutaiton until results were obtained or a room became available in the Emergency Department. Patient was counseled not to leave prior to receiving the results of their workup.      Keaton Carlson MD  12/08/21 1449    "

## 2021-12-09 NOTE — DISCHARGE INSTRUCTIONS
Return for any significant worsening of pain, fevers, severe bloody stools. After symptoms improved follow-up with your regular physician for referral for colonoscopy.

## 2021-12-09 NOTE — ED PROVIDER NOTES
"EMERGENCY DEPARTMENT ENCOUNTER      NAME: Rosey Tucker  AGE: 51 year old female  YOB: 1970  MRN: [id number]  EVALUATION DATE & TIME: No admission date for patient encounter.    PCP: Prabha Mata    ED PROVIDER: Randall Oliva M.D.      Chief Complaint   Patient presents with     Diarrhea     Rectal Bleeding         FINAL IMPRESSION:  1. Non-specific colitis          ED COURSE & MEDICAL DECISION MAKING:    Pertinent Labs & Imaging studies reviewed. (See chart for details)  51 year old female presents to the Emergency Department for evaluation of diarrhea, rectal bleeding abdominal pain.  Symptoms started on Monday with some slight diarrhea.  She then developed slight achiness in the left side of her abdomen.  Today she was passing \"teaspoon sizes blood\".  Patient denies any unusual exposures or ingestions.  No exotic travel.  Laboratory evaluation imaging from triage reveals evidence descending and sigmoid colitis of unspecified etiology.  Laboratory evaluation unremarkable.  On exam patient is well-nourished well-developed female in minimal distress.  Exam is quite benign.  Minimal left-sided abdominal tenderness.  Given findings and physical exam patient is appropriate for continued outpatient management.  Will initiate Levaquin out of caution for potential infectious colitis.  Routine return precautions given.. Patient appears non toxic with stable vitals signs. Overall exam is benign.      7:16 PM I met with the patient for the initial interview and physical examination. Discussed plan for treatment and workup in the ED.      At the conclusion of the encounter I discussed the results of all of the tests and the disposition. The questions were answered and return precautions provided. The patient or family acknowledged understanding and was agreeable with the care plan.       PPE: Provider wore gloves, N95 mask, eye protection, surgical cap.     MEDICATIONS GIVEN IN THE " EMERGENCY:  Medications   dicyclomine (BENTYL) tablet 20 mg (20 mg Oral Not Given 12/8/21 1653)   0.9% sodium chloride BOLUS (0 mLs Intravenous Stopped 12/8/21 1853)   iopamidol (ISOVUE-370) solution 100 mL (100 mLs Intravenous Given 12/8/21 1832)       NEW PRESCRIPTIONS STARTED AT TODAY'S ER VISIT  New Prescriptions    LEVOFLOXACIN (LEVAQUIN) 500 MG TABLET    Take 1 tablet (500 mg) by mouth daily for 7 days          =================================================================    HPI    Patient information was obtained from: patient     Use of Intrepreter: N/A       Rosey Tucker is a 51 year old female with a pertient medical history of hypertension and alcohol use disorder who presents to the ED for evaluation of diarrhea and rectal bleeding.     On 12/6/21 (2 days ago), patient began experiencing diarrhea. Her symptoms seemed to improve but then yesterday and today they worsened. Patient was experiencing stools with ~1 teaspoon of blood in the stool. She also endorses aching in her LUQ. Patient denies any recent travel or sick exposures. She has not had any recent colonoscopies. Patient denies any other additional symptoms at this time.     REVIEW OF SYSTEMS   Constitutional:  Denies fever, chills  Respiratory:  Denies productive cough or increased work of breathing  Cardiovascular:  Denies chest pain, palpitations  GI:  Endorses diarrhea, blood in stool, and LUQ abdominal aching, Denies abdominal pain, nausea, vomiting  Musculoskeletal:  Denies any new muscle/joint swelling  Skin:  Denies rash   Neurologic:  Denies focal weakness  All systems negative except as marked.     PAST MEDICAL HISTORY:  History reviewed. No pertinent past medical history.    PAST SURGICAL HISTORY:  History reviewed. No pertinent surgical history.    CURRENT MEDICATIONS:      Current Facility-Administered Medications:      dicyclomine (BENTYL) tablet 20 mg, 20 mg, Oral, Once, Keaton Carlson MD    Current Outpatient Medications:  "     levofloxacin (LEVAQUIN) 500 MG tablet, Take 1 tablet (500 mg) by mouth daily for 7 days, Disp: 7 tablet, Rfl: 0     citalopram (CELEXA) 20 MG tablet, Take 20 mg by mouth daily, Disp: , Rfl:      EPINEPHrine (ANY BX GENERIC EQUIV) 0.3 MG/0.3ML injection 2-pack, Inject 0.3 mg into the muscle, Disp: , Rfl:      lamoTRIgine (LAMICTAL) 100 MG TBDP ODT tab, Take by mouth 2 times daily Pt unsure of dose, Disp: , Rfl:      pantoprazole (PROTONIX) 20 MG EC tablet, Take 40 mg by mouth daily Pt unsure of dose, but \"once a day\"., Disp: , Rfl:     ALLERGIES:  Allergies   Allergen Reactions     Bee Venom Angioedema     1st reaction 7/30/2020     Seasonal Allergies        FAMILY HISTORY:  Family History   Problem Relation Age of Onset     Mental Illness Mother      Anxiety Disorder Mother      Depression Mother      Substance Abuse Father      No Known Problems Sister      No Known Problems Maternal Grandmother      No Known Problems Maternal Grandfather      Breast Cancer Paternal Grandmother      No Known Problems Paternal Grandfather      Depression Daughter      No Known Problems Daughter      No Known Problems Maternal Aunt      No Known Problems Paternal Aunt      Depression Other      Hereditary Breast and Ovarian Cancer Syndrome No family hx of      Cancer No family hx of      Colon Cancer No family hx of      Endometrial Cancer No family hx of      Ovarian Cancer No family hx of        SOCIAL HISTORY:   Social History     Socioeconomic History     Marital status: Single     Spouse name: Not on file     Number of children: Not on file     Years of education: Not on file     Highest education level: Not on file   Occupational History     Not on file   Tobacco Use     Smoking status: Current Every Day Smoker     Packs/day: 0.25     Types: Cigarettes     Smokeless tobacco: Never Used   Substance and Sexual Activity     Alcohol use: Not on file     Drug use: Not on file     Sexual activity: Not on file   Other Topics " Concern     Parent/sibling w/ CABG, MI or angioplasty before 65F 55M? Not Asked   Social History Narrative     Not on file     Social Determinants of Health     Financial Resource Strain: Not on file   Food Insecurity: Not on file   Transportation Needs: Not on file   Physical Activity: Not on file   Stress: Not on file   Social Connections: Not on file   Intimate Partner Violence: Not on file   Housing Stability: Not on file       VITALS:  Patient Vitals for the past 24 hrs:   BP Temp Temp src Pulse Resp SpO2 Weight   12/08/21 1653 (!) 167/114 -- -- 98 -- -- --   12/08/21 1446 (!) 166/107 97.7  F (36.5  C) Oral 101 16 98 % 59 kg (130 lb)        PHYSICAL EXAM    Constitutional:  Awake, alert, in mild apparent distress  HENT:  Normocephalic, Atraumatic. Bilateral external ears normal. Oropharynx moist. Nose normal. Neck- Normal range of motion with no guarding, No midline cervical tenderness, Supple, No stridor.   Eyes:  PERRL, EOMI with no signs of entrapment, Conjunctiva normal, No discharge.   Respiratory:  Normal breath sounds, No respiratory distress, No wheezing.    Cardiovascular:  Normal heart rate, Normal rhythm, No appreciable rubs or gallops.   GI:  Soft, Mild LUQ tenderness, No distension, No palpable masses  Musculoskeletal:  Intact distal pulses, No edema. Good range of motion in all major joints. No tenderness to palpation or major deformities noted.  Integument:  Warm, Dry, No erythema, No rash.   Neurologic:  Alert & oriented, Normal motor function, Normal sensory function, No focal deficits noted.   Psychiatric:  Affect normal, Judgment normal, Mood normal.     LAB:  All pertinent labs reviewed and interpreted.  Results for orders placed or performed during the hospital encounter of 12/08/21   CT Abdomen Pelvis w Contrast    Impression    IMPRESSION:   1.  Descending and sigmoid colitis. See possible etiologies above.  2.  Diffuse hepatic steatosis.  3.  IUD.   Basic metabolic panel   Result Value  Ref Range    Sodium 139 136 - 145 mmol/L    Potassium 3.5 3.5 - 5.0 mmol/L    Chloride 103 98 - 107 mmol/L    Carbon Dioxide (CO2) 23 22 - 31 mmol/L    Anion Gap 13 5 - 18 mmol/L    Urea Nitrogen 11 8 - 22 mg/dL    Creatinine 0.74 0.60 - 1.10 mg/dL    Calcium 10.2 8.5 - 10.5 mg/dL    Glucose 104 70 - 125 mg/dL    GFR Estimate >90 >60 mL/min/1.73m2   Hepatic function panel   Result Value Ref Range    Bilirubin Total 1.0 0.0 - 1.0 mg/dL    Bilirubin Direct 0.3 <=0.5 mg/dL    Protein Total 7.8 6.0 - 8.0 g/dL    Albumin 4.4 3.5 - 5.0 g/dL    Alkaline Phosphatase 88 45 - 120 U/L    AST 32 0 - 40 U/L    ALT 33 0 - 45 U/L   Result Value Ref Range    Lipase 28 0 - 52 U/L   CRP inflammation   Result Value Ref Range    CRP 1.2 (H) 0.0-<0.8 mg/dL   Lactic acid whole blood   Result Value Ref Range    Lactic Acid 1.6 0.7 - 2.0 mmol/L   CBC with platelets and differential   Result Value Ref Range    WBC Count 9.1 4.0 - 11.0 10e3/uL    RBC Count 4.58 3.80 - 5.20 10e6/uL    Hemoglobin 15.9 (H) 11.7 - 15.7 g/dL    Hematocrit 45.9 35.0 - 47.0 %     78 - 100 fL    MCH 34.7 (H) 26.5 - 33.0 pg    MCHC 34.6 31.5 - 36.5 g/dL    RDW 11.9 10.0 - 15.0 %    Platelet Count 251 150 - 450 10e3/uL    % Neutrophils 67 %    % Lymphocytes 23 %    % Monocytes 10 %    % Eosinophils 0 %    % Basophils 0 %    % Immature Granulocytes 0 %    NRBCs per 100 WBC 0 <1 /100    Absolute Neutrophils 6.0 1.6 - 8.3 10e3/uL    Absolute Lymphocytes 2.1 0.8 - 5.3 10e3/uL    Absolute Monocytes 0.9 0.0 - 1.3 10e3/uL    Absolute Eosinophils 0.0 0.0 - 0.7 10e3/uL    Absolute Basophils 0.0 0.0 - 0.2 10e3/uL    Absolute Immature Granulocytes 0.0 <=0.4 10e3/uL    Absolute NRBCs 0.0 10e3/uL   Adult Type and Screen   Result Value Ref Range    ABO/RH(D) AB NEG     Antibody Screen Negative Negative    SPECIMEN EXPIRATION DATE [id number]        RADIOLOGY:  Reviewed all pertinent imaging. Please see official radiology report.  CT Abdomen Pelvis w Contrast   Final  Result   IMPRESSION:    1.  Descending and sigmoid colitis. See possible etiologies above.   2.  Diffuse hepatic steatosis.   3.  IUD.              I, Estefanía Zaman, am serving as a scribe to document services personally performed by Randall Oliva MD, based on my observation and the provider's statements to me. I, Randall Oliva MD attest that Estefanía Zaman is acting in a scribe capacity, has observed my performance of the services and has documented them in accordance with my direction.    Randall Oliva M.D.  Emergency Medicine  [facility] EMERGENCY DEPARTMENT     Randall Oliva MD  12/08/21 0251

## 2021-12-22 ENCOUNTER — HOSPITAL ENCOUNTER (EMERGENCY)
Facility: CLINIC | Age: 51
Discharge: HOME | End: 2021-12-22
Attending: EMERGENCY MEDICINE | Admitting: EMERGENCY MEDICINE
Payer: COMMERCIAL

## 2021-12-22 VITALS
WEIGHT: 144.3 LBS | DIASTOLIC BLOOD PRESSURE: 86 MMHG | OXYGEN SATURATION: 94 % | TEMPERATURE: 98 F | HEART RATE: 110 BPM | SYSTOLIC BLOOD PRESSURE: 123 MMHG | RESPIRATION RATE: 18 BRPM | BODY MASS INDEX: 25.56 KG/M2

## 2021-12-22 DIAGNOSIS — F10.10 ALCOHOL ABUSE: ICD-10-CM

## 2021-12-22 DIAGNOSIS — F10.220 ALCOHOL DEPENDENCE WITH UNCOMPLICATED INTOXICATION (H): ICD-10-CM

## 2021-12-22 LAB
ALCOHOL BREATH TEST: 0.19 (ref 0–0.01)
AMPHETAMINES UR QL SCN: NORMAL
BARBITURATES UR QL: NORMAL
BENZODIAZ UR QL: NORMAL
CANNABINOIDS UR QL SCN: NORMAL
COCAINE UR QL: NORMAL
OPIATES UR QL SCN: NORMAL

## 2021-12-22 PROCEDURE — 99283 EMERGENCY DEPT VISIT LOW MDM: CPT | Performed by: EMERGENCY MEDICINE

## 2021-12-22 PROCEDURE — 99283 EMERGENCY DEPT VISIT LOW MDM: CPT

## 2021-12-22 PROCEDURE — 82075 ASSAY OF BREATH ETHANOL: CPT

## 2021-12-22 PROCEDURE — 80307 DRUG TEST PRSMV CHEM ANLYZR: CPT | Performed by: EMERGENCY MEDICINE

## 2021-12-22 RX ORDER — METOPROLOL SUCCINATE 25 MG/1
25 TABLET, EXTENDED RELEASE ORAL DAILY
Status: ON HOLD | COMMUNITY
Start: 2021-12-16 | End: 2021-12-23

## 2021-12-23 ENCOUNTER — HOSPITAL ENCOUNTER (INPATIENT)
Facility: CLINIC | Age: 51
LOS: 2 days | Discharge: PSYCHIATRIC OR SUBSTANCE USE FACILITY | End: 2021-12-25
Attending: EMERGENCY MEDICINE | Admitting: PSYCHIATRY & NEUROLOGY
Payer: COMMERCIAL

## 2021-12-23 ENCOUNTER — TELEPHONE (OUTPATIENT)
Dept: BEHAVIORAL HEALTH | Facility: CLINIC | Age: 51
End: 2021-12-23

## 2021-12-23 DIAGNOSIS — F10.930 ALCOHOL WITHDRAWAL SYNDROME WITHOUT COMPLICATION (H): ICD-10-CM

## 2021-12-23 DIAGNOSIS — E56.9 VITAMIN DEFICIENCY: ICD-10-CM

## 2021-12-23 DIAGNOSIS — F33.1 MAJOR DEPRESSIVE DISORDER, RECURRENT EPISODE, MODERATE (H): Primary | ICD-10-CM

## 2021-12-23 DIAGNOSIS — I10 HYPERTENSION, UNSPECIFIED TYPE: ICD-10-CM

## 2021-12-23 DIAGNOSIS — K21.00 GASTROESOPHAGEAL REFLUX DISEASE WITH ESOPHAGITIS, UNSPECIFIED WHETHER HEMORRHAGE: ICD-10-CM

## 2021-12-23 DIAGNOSIS — Z11.52 ENCOUNTER FOR SCREENING LABORATORY TESTING FOR SEVERE ACUTE RESPIRATORY SYNDROME CORONAVIRUS 2 (SARS-COV-2): ICD-10-CM

## 2021-12-23 PROBLEM — F10.10 ALCOHOL ABUSE: Status: ACTIVE | Noted: 2021-12-23

## 2021-12-23 LAB
ABO/RH(D): NORMAL
ALBUMIN SERPL-MCNC: 4 G/DL (ref 3.4–5)
ALCOHOL BREATH TEST: 0.07 (ref 0–0.01)
ALP SERPL-CCNC: 86 U/L (ref 40–150)
ALT SERPL W P-5'-P-CCNC: 51 U/L (ref 0–50)
AMPHETAMINES UR QL SCN: NORMAL
ANION GAP SERPL CALCULATED.3IONS-SCNC: 9 MMOL/L (ref 3–14)
ANTIBODY SCREEN: NEGATIVE
AST SERPL W P-5'-P-CCNC: 67 U/L (ref 0–45)
BARBITURATES UR QL: NORMAL
BASOPHILS # BLD AUTO: 0.1 10E3/UL (ref 0–0.2)
BASOPHILS NFR BLD AUTO: 1 %
BENZODIAZ UR QL: NORMAL
BILIRUB SERPL-MCNC: 0.5 MG/DL (ref 0.2–1.3)
BUN SERPL-MCNC: 11 MG/DL (ref 7–30)
CALCIUM SERPL-MCNC: 9 MG/DL (ref 8.5–10.1)
CANNABINOIDS UR QL SCN: NORMAL
CHLORIDE BLD-SCNC: 106 MMOL/L (ref 94–109)
CO2 SERPL-SCNC: 24 MMOL/L (ref 20–32)
COCAINE UR QL: NORMAL
CREAT SERPL-MCNC: 0.62 MG/DL (ref 0.52–1.04)
EOSINOPHIL # BLD AUTO: 0.1 10E3/UL (ref 0–0.7)
EOSINOPHIL NFR BLD AUTO: 2 %
ERYTHROCYTE [DISTWIDTH] IN BLOOD BY AUTOMATED COUNT: 12 % (ref 10–15)
GFR SERPL CREATININE-BSD FRML MDRD: >90 ML/MIN/1.73M2
GLUCOSE BLD-MCNC: 88 MG/DL (ref 70–99)
HCG UR QL: NEGATIVE
HCT VFR BLD AUTO: 49.6 % (ref 35–47)
HGB BLD-MCNC: 15.7 G/DL (ref 11.7–15.7)
HOLD SPECIMEN: NORMAL
IMM GRANULOCYTES # BLD: 0 10E3/UL
IMM GRANULOCYTES NFR BLD: 0 %
LYMPHOCYTES # BLD AUTO: 2.2 10E3/UL (ref 0.8–5.3)
LYMPHOCYTES NFR BLD AUTO: 38 %
MAGNESIUM SERPL-MCNC: 2 MG/DL (ref 1.6–2.3)
MCH RBC QN AUTO: 35.1 PG (ref 26.5–33)
MCHC RBC AUTO-ENTMCNC: 31.7 G/DL (ref 31.5–36.5)
MCV RBC AUTO: 111 FL (ref 78–100)
MONOCYTES # BLD AUTO: 0.5 10E3/UL (ref 0–1.3)
MONOCYTES NFR BLD AUTO: 9 %
NEUTROPHILS # BLD AUTO: 2.9 10E3/UL (ref 1.6–8.3)
NEUTROPHILS NFR BLD AUTO: 50 %
NRBC # BLD AUTO: 0 10E3/UL
NRBC BLD AUTO-RTO: 0 /100
OPIATES UR QL SCN: NORMAL
PLATELET # BLD AUTO: 241 10E3/UL (ref 150–450)
POTASSIUM BLD-SCNC: 3.8 MMOL/L (ref 3.4–5.3)
PROT SERPL-MCNC: 7.9 G/DL (ref 6.8–8.8)
RBC # BLD AUTO: 4.47 10E6/UL (ref 3.8–5.2)
SARS-COV-2 RNA RESP QL NAA+PROBE: NEGATIVE
SODIUM SERPL-SCNC: 139 MMOL/L (ref 133–144)
SPECIMEN EXPIRATION DATE: NORMAL
WBC # BLD AUTO: 5.8 10E3/UL (ref 4–11)

## 2021-12-23 PROCEDURE — 99284 EMERGENCY DEPT VISIT MOD MDM: CPT | Performed by: EMERGENCY MEDICINE

## 2021-12-23 PROCEDURE — HZ2ZZZZ DETOXIFICATION SERVICES FOR SUBSTANCE ABUSE TREATMENT: ICD-10-PCS | Performed by: PSYCHIATRY & NEUROLOGY

## 2021-12-23 PROCEDURE — 128N000004 HC R&B CD ADULT

## 2021-12-23 PROCEDURE — C9803 HOPD COVID-19 SPEC COLLECT: HCPCS | Performed by: EMERGENCY MEDICINE

## 2021-12-23 PROCEDURE — 80307 DRUG TEST PRSMV CHEM ANLYZR: CPT | Performed by: EMERGENCY MEDICINE

## 2021-12-23 PROCEDURE — 250N000013 HC RX MED GY IP 250 OP 250 PS 637: Performed by: EMERGENCY MEDICINE

## 2021-12-23 PROCEDURE — 82040 ASSAY OF SERUM ALBUMIN: CPT | Performed by: EMERGENCY MEDICINE

## 2021-12-23 PROCEDURE — 250N000013 HC RX MED GY IP 250 OP 250 PS 637: Performed by: PSYCHIATRY & NEUROLOGY

## 2021-12-23 PROCEDURE — 81025 URINE PREGNANCY TEST: CPT | Performed by: EMERGENCY MEDICINE

## 2021-12-23 PROCEDURE — 82075 ASSAY OF BREATH ETHANOL: CPT | Performed by: EMERGENCY MEDICINE

## 2021-12-23 PROCEDURE — 36415 COLL VENOUS BLD VENIPUNCTURE: CPT | Performed by: EMERGENCY MEDICINE

## 2021-12-23 PROCEDURE — 80053 COMPREHEN METABOLIC PANEL: CPT | Performed by: EMERGENCY MEDICINE

## 2021-12-23 PROCEDURE — 99285 EMERGENCY DEPT VISIT HI MDM: CPT | Mod: 25 | Performed by: EMERGENCY MEDICINE

## 2021-12-23 PROCEDURE — 87635 SARS-COV-2 COVID-19 AMP PRB: CPT | Performed by: EMERGENCY MEDICINE

## 2021-12-23 PROCEDURE — 85025 COMPLETE CBC W/AUTO DIFF WBC: CPT | Performed by: EMERGENCY MEDICINE

## 2021-12-23 PROCEDURE — 83735 ASSAY OF MAGNESIUM: CPT | Performed by: EMERGENCY MEDICINE

## 2021-12-23 PROCEDURE — 86850 RBC ANTIBODY SCREEN: CPT | Performed by: EMERGENCY MEDICINE

## 2021-12-23 RX ORDER — METOPROLOL SUCCINATE 25 MG/1
25 TABLET, EXTENDED RELEASE ORAL DAILY
Status: ON HOLD | COMMUNITY
End: 2021-12-25

## 2021-12-23 RX ORDER — PANTOPRAZOLE SODIUM 40 MG/1
40 TABLET, DELAYED RELEASE ORAL DAILY
Status: ON HOLD | COMMUNITY
End: 2021-12-25

## 2021-12-23 RX ORDER — MULTIPLE VITAMINS W/ MINERALS TAB 9MG-400MCG
1 TAB ORAL DAILY
Status: DISCONTINUED | OUTPATIENT
Start: 2021-12-23 | End: 2021-12-25 | Stop reason: HOSPADM

## 2021-12-23 RX ORDER — FOLIC ACID 1 MG/1
1 TABLET ORAL DAILY
Status: DISCONTINUED | OUTPATIENT
Start: 2021-12-23 | End: 2021-12-25

## 2021-12-23 RX ORDER — LORAZEPAM 1 MG/1
1-4 TABLET ORAL EVERY 30 MIN PRN
Status: DISCONTINUED | OUTPATIENT
Start: 2021-12-23 | End: 2021-12-24

## 2021-12-23 RX ORDER — LORAZEPAM 1 MG/1
1-4 TABLET ORAL EVERY 30 MIN PRN
Status: DISCONTINUED | OUTPATIENT
Start: 2021-12-23 | End: 2021-12-23

## 2021-12-23 RX ORDER — LAMOTRIGINE 100 MG/1
100 TABLET ORAL DAILY
Status: ON HOLD | COMMUNITY
End: 2021-12-25

## 2021-12-23 RX ORDER — ATENOLOL 50 MG/1
50 TABLET ORAL DAILY PRN
Status: DISCONTINUED | OUTPATIENT
Start: 2021-12-23 | End: 2021-12-24

## 2021-12-23 RX ORDER — CITALOPRAM HYDROBROMIDE 40 MG/1
40 TABLET ORAL DAILY
Status: ON HOLD | COMMUNITY
End: 2021-12-25

## 2021-12-23 RX ADMIN — ATENOLOL 50 MG: 50 TABLET ORAL at 19:08

## 2021-12-23 RX ADMIN — FOLIC ACID 1 MG: 1 TABLET ORAL at 19:08

## 2021-12-23 RX ADMIN — LORAZEPAM 1 MG: 1 TABLET ORAL at 16:48

## 2021-12-23 RX ADMIN — THIAMINE HCL TAB 100 MG 100 MG: 100 TAB at 19:08

## 2021-12-23 RX ADMIN — MULTIPLE VITAMINS W/ MINERALS TAB 1 TABLET: TAB at 19:08

## 2021-12-23 RX ADMIN — LORAZEPAM 2 MG: 1 TABLET ORAL at 19:08

## 2021-12-23 ASSESSMENT — ACTIVITIES OF DAILY LIVING (ADL)
WEAR_GLASSES_OR_BLIND: YES
DRESS: INDEPENDENT
FALL_HISTORY_WITHIN_LAST_SIX_MONTHS: NO
DOING_ERRANDS_INDEPENDENTLY_DIFFICULTY: NO
DRESSING/BATHING_DIFFICULTY: NO
WALKING_OR_CLIMBING_STAIRS_DIFFICULTY: NO
DIFFICULTY_EATING/SWALLOWING: NO
HYGIENE/GROOMING: INDEPENDENT
TOILETING_ISSUES: NO
PATIENT_/_FAMILY_COMMUNICATION_STYLE: SPOKEN LANGUAGE (ENGLISH OR BILINGUAL)
HEARING_DIFFICULTY_OR_DEAF: NO
CONCENTRATING,_REMEMBERING_OR_MAKING_DECISIONS_DIFFICULTY: NO
LAUNDRY: WITH SUPERVISION
ORAL_HYGIENE: INDEPENDENT

## 2021-12-23 ASSESSMENT — ENCOUNTER SYMPTOMS
EYE REDNESS: 0
NECK PAIN: 0
DIFFICULTY URINATING: 0
SHORTNESS OF BREATH: 0
ABDOMINAL PAIN: 0
SORE THROAT: 0
SLEEP DISTURBANCE: 0
BACK PAIN: 0
DYSURIA: 0
HEADACHES: 0
FEVER: 0
VOMITING: 0
COUGH: 0
NAUSEA: 0

## 2021-12-23 ASSESSMENT — MIFFLIN-ST. JEOR: SCORE: 1147.93

## 2021-12-23 NOTE — ED PROVIDER NOTES
ED Provider Note  [facility]      History     Chief Complaint   Patient presents with     Addiction Problem     Pt drinks a lot everyday and last drink was today. Wants detox.     HPI  Rosey Tucker is a 51 year old female who presents to the emergency department seeking detox from alcohol.  She states that she drinks fireball whiskey.  She drinks 1 pint of whiskey per day.  Patient develops tremors when she does not drink.  She denies a history of alcohol withdrawal seizures or DTs.  Patient denies any active depression or suicide ideation.  She denies any recent fall or injury.  She denies any recent illness or medical concerns.  She states that she did have colitis with bloody stool 2 weeks ago that resolved after 1 day.  The patient plans on going to a treatment facility but needs to go to detox first.    Past Medical History  Past Medical History:   Diagnosis Date     Depressive disorder      Hypertension      History reviewed. No pertinent surgical history.  citalopram (CELEXA) 20 MG tablet  lamoTRIgine (LAMICTAL) 100 MG TBDP ODT tab  metoprolol succinate ER (TOPROL-XL) 25 MG 24 hr tablet  pantoprazole (PROTONIX) 20 MG EC tablet  EPINEPHrine (ANY BX GENERIC EQUIV) 0.3 MG/0.3ML injection 2-pack      Allergies   Allergen Reactions     Bee Venom Angioedema     1st reaction 7/30/2020     Seasonal Allergies      Family History  Family History   Problem Relation Age of Onset     Mental Illness Mother      Anxiety Disorder Mother      Depression Mother      Substance Abuse Father      No Known Problems Sister      No Known Problems Maternal Grandmother      No Known Problems Maternal Grandfather      Breast Cancer Paternal Grandmother      No Known Problems Paternal Grandfather      Depression Daughter      No Known Problems Daughter      No Known Problems Maternal Aunt      No Known Problems Paternal Aunt      Depression Other      Hereditary Breast and Ovarian Cancer Syndrome No family  hx of      Cancer No family hx of      Colon Cancer No family hx of      Endometrial Cancer No family hx of      Ovarian Cancer No family hx of      Social History   Social History     Tobacco Use     Smoking status: Current Every Day Smoker     Packs/day: 0.25     Types: Cigarettes     Smokeless tobacco: Never Used   Substance Use Topics     Alcohol use: Yes     Comment: daily     Drug use: Never      Past medical history, past surgical history, medications, allergies, family history, and social history were reviewed with the patient. No additional pertinent items.       Review of Systems   Constitutional: Negative for fever.   HENT: Negative for sore throat.    Eyes: Negative for redness.   Respiratory: Negative for cough and shortness of breath.    Cardiovascular: Negative for chest pain.   Gastrointestinal: Negative for abdominal pain, nausea and vomiting.   Genitourinary: Negative for difficulty urinating and dysuria.   Musculoskeletal: Negative for back pain and neck pain.   Skin: Negative for rash.   Neurological: Negative for headaches.   Psychiatric/Behavioral: Negative for sleep disturbance.   All other systems reviewed and are negative.    A complete review of systems was performed with pertinent positives and negatives noted in the HPI, and all other systems negative.    Physical Exam   BP: 123/86  Pulse: 110  Temp: 98  F (36.7  C)  Resp: 18  Weight: 65.5 kg (144 lb 4.8 oz)  SpO2: 94 %  Physical Exam  Vitals and nursing note reviewed.   Constitutional:       General: She is not in acute distress.     Appearance: Normal appearance. She is not diaphoretic.   HENT:      Head: Atraumatic.      Mouth/Throat:      Pharynx: No oropharyngeal exudate.   Eyes:      General: No scleral icterus.     Pupils: Pupils are equal, round, and reactive to light.   Cardiovascular:      Rate and Rhythm: Normal rate and regular rhythm.      Pulses: Normal pulses.      Heart sounds: Normal heart sounds.   Pulmonary:       Effort: No respiratory distress.      Breath sounds: Normal breath sounds.   Abdominal:      General: Bowel sounds are normal.      Palpations: Abdomen is soft.      Tenderness: There is no abdominal tenderness.   Musculoskeletal:         General: No tenderness. Normal range of motion.   Skin:     General: Skin is warm and dry.      Findings: No rash.   Neurological:      General: No focal deficit present.      Mental Status: She is alert.      Motor: No weakness.      Gait: Gait normal.   Psychiatric:         Mood and Affect: Mood normal.         Behavior: Behavior normal.         ED Course      Procedures       No detox beds available here.  Does not desire admission elsewhere.     Results for orders placed or performed during the hospital encounter of 12/22/21   Drug abuse screen 1 urine (ED)     Status: Normal   Result Value Ref Range    Amphetamines Urine Screen Negative Screen Negative    Barbiturates Urine Screen Negative Screen Negative    Benzodiazepines Urine Screen Negative Screen Negative    Cannabinoids Urine Screen Negative Screen Negative    Cocaine Urine Screen Negative Screen Negative    Opiates Urine Screen Negative Screen Negative   Alcohol breath test POCT     Status: Abnormal   Result Value Ref Range    Alcohol Breath Test 0.191 (A) 0.00 - 0.01   Urine Drugs of Abuse Screen     Status: Normal    Narrative    The following orders were created for panel order Urine Drugs of Abuse Screen.  Procedure                               Abnormality         Status                     ---------                               -----------         ------                     Drug abuse screen 1 urin...[[id number]]  Normal              Final result                 Please view results for these tests on the individual orders.     Medications - No data to display     Assessments & Plan (with Medical Decision Making)   51 year old female with history of alcohol abuse and dependence to the emergency department seeking  detox.  She has an elevated alcohol level here and no evidence for withdrawal.  She does not have any medical concerns and otherwise has a normal physical exam.  She denies depression and suicidal ideation.  She is here with a sober friend.  Unfortunately, there are no detox beds available.  Her sober friend will take her home.  The patient will return to the emergency department tomorrow morning to check on detox bed availability.  Return precautions were provided.    I have reviewed the nursing notes. I have reviewed the findings, diagnosis, plan and need for follow up with the patient.    Discharge Medication List as of 12/22/2021 11:32 PM          Final diagnoses:   Alcohol abuse     Chart documentation was completed with Dragon voice-recognition software. Even though reviewed, this chart may still contain some grammatical, spelling, and word errors.     --    [facility] EMERGENCY DEPARTMENT  12/22/2021     Randall Reyes MD  12/23/21 0041

## 2021-12-23 NOTE — ED PROVIDER NOTES
"    [facility] [place], [place] EMERGENCY DEPARTMENT ([facility])  12/23/21  History     Chief Complaint   Patient presents with     Drug / Alcohol Assessment     looking for detox off of alcohol, 1 pint of fireball daily and sometimes a glass or two of wine     The history is provided by the patient.     Rosey Tucker is a 51 year old female who has a significant medical history of alcohol abuse, HTN, anxiety, and depression who presents to the Emergency Department seeking detox from alcohol.  Patient reports drinking \"1 pint of fireball daily for 3 years\".  She denies having history of alcohol withdrawal, alcohol withdrawal induced seizure, or DTs.  She reports experiencing some \"shakes\" in the past.  She reports that her last drink of alcohol was at 12:30 AM today, 1 glass of wine.  She states that she is a smoker.  She reports that her mood has been depressed, she reports some suicidal thinking, but with no plan.  She states that she feels safe here in the emergency department.  Of note, she reports presenting to [facility] ED 1 week ago after experiencing bloody bowel movement and bloody emesis.  She was worked up, but was discharged with a diagnosis of \"inflammation\", she was not admitted to the hospital.  She states that her BMs have been \"normal\" since, she denies recent black tarry stools or loose stools.    Per chart review, the patient presented to the ED yesterday seeking detox, there were no detox beds available.  Patient is returned to the ED today to check on detox bed availability, as instructed.  The patient presented to [facility] on 12/8/2021 after passing a \"teaspoon sized blood\" in her BM.  Exam was found to be benign.  She was diagnosed with nonspecific colitis and discharged.    Past Medical History  Past Medical History:   Diagnosis Date     Depressive disorder      Hypertension      History reviewed. No pertinent surgical history.  citalopram (CELEXA) 20 MG tablet  EPINEPHrine (ANY BX GENERIC EQUIV) 0.3 " MG/0.3ML injection 2-pack  lamoTRIgine (LAMICTAL) 100 MG TBDP ODT tab  metoprolol succinate ER (TOPROL-XL) 25 MG 24 hr tablet  pantoprazole (PROTONIX) 20 MG EC tablet      Allergies   Allergen Reactions     Bee Venom Angioedema     1st reaction 7/30/2020     Seasonal Allergies      Family History  Family History   Problem Relation Age of Onset     Mental Illness Mother      Anxiety Disorder Mother      Depression Mother      Substance Abuse Father      No Known Problems Sister      No Known Problems Maternal Grandmother      No Known Problems Maternal Grandfather      Breast Cancer Paternal Grandmother      No Known Problems Paternal Grandfather      Depression Daughter      No Known Problems Daughter      No Known Problems Maternal Aunt      No Known Problems Paternal Aunt      Depression Other      Hereditary Breast and Ovarian Cancer Syndrome No family hx of      Cancer No family hx of      Colon Cancer No family hx of      Endometrial Cancer No family hx of      Ovarian Cancer No family hx of      Social History   Social History     Tobacco Use     Smoking status: Current Every Day Smoker     Packs/day: 0.25     Types: Cigarettes     Smokeless tobacco: Never Used   Substance Use Topics     Alcohol use: Yes     Comment: daily     Drug use: Never      Past medical history, past surgical history, medications, allergies, family history, and social history were reviewed with the patient. No additional pertinent items.     I have reviewed the Medications, Allergies, Past Medical and Surgical History, and Social History in the Epic system.    Review of Systems   Constitutional: Negative for fever.   Respiratory: Negative for shortness of breath.    Cardiovascular: Negative for chest pain.   Gastrointestinal: Negative for abdominal pain.   Genitourinary: Negative for dysuria and flank pain.   All other systems reviewed and are negative.    A complete review of systems was performed with pertinent positives and negatives  noted in the HPI, and all other systems negative.    Physical Exam   BP: 128/85  Pulse: 100  Temp: 97.4  F (36.3  C)  Resp: 16  Weight: 59 kg (130 lb)  SpO2: 95 %      Physical Exam  Vitals and nursing note reviewed.   Constitutional:       General: She is not in acute distress.     Appearance: She is not diaphoretic.   HENT:      Head: Atraumatic.      Mouth/Throat:      Pharynx: No oropharyngeal exudate.   Eyes:      General: No scleral icterus.     Pupils: Pupils are equal, round, and reactive to light.   Cardiovascular:      Rate and Rhythm: Normal rate and regular rhythm.      Heart sounds: Normal heart sounds.   Pulmonary:      Effort: No respiratory distress.      Breath sounds: Normal breath sounds.   Abdominal:      General: Bowel sounds are normal.      Palpations: Abdomen is soft.      Tenderness: There is no abdominal tenderness.   Musculoskeletal:         General: No tenderness.   Skin:     General: Skin is warm.      Findings: No rash.   Neurological:      General: No focal deficit present.      Mental Status: She is oriented to person, place, and time.         ED Course     At 10:21 AM the patient was seen and examined by Florentin Guillen MD in Room ED16C.        Procedures         Results for orders placed or performed during the hospital encounter of 12/23/21 (from the past 24 hour(s))   Alcohol breath test POCT   Result Value Ref Range    Alcohol Breath Test 0.073 (A) 0.00 - 0.01     Medications - No data to display          Assessments & Plan (with Medical Decision Making)     51 year old female who has a significant medical history of alcohol abuse, HTN, anxiety, and depression who presents to the Emergency Department seeking detox from alcohol. Patient presentation concerning for patient placed on MSSA protocol and screening labs drawn sent reviewed document in epic essentially all unremarkable.  Case discussed with behavioral intake who agreed with plan for admission.     I have reviewed the  nursing notes.    I have reviewed the findings, diagnosis, plan and need for follow up with the patient.    New Prescriptions    No medications on file       Final diagnoses:   Alcohol withdrawal syndrome without complication (H)       IKashmir am serving as a trained medical scribe to document services personally performed by Floerntin Guillen MD based on the provider's statements to me.      Florentin MCGINNIS MD, was physically present and have reviewed and verified the accuracy of this note documented by Kashmir Coyle.     Florentin Guillen MD  12/23/2021   [facility] EMERGENCY DEPARTMENT     Florentin Guillen MD  12/25/21 0153

## 2021-12-23 NOTE — TELEPHONE ENCOUNTER
Patient cleared and ready for behavioral bed placement: Yes     S:  3:22 PM Dr Guillen presented 51/F at [facility] for detox    B:  Pt wants ETOH detox.  Pt reports drinking approximately 1 pint fireball daily.  Last drink was at 12:30 PM.  No SI HI or psychosis.  Pt reports mild Depression and is highly motivated for detox and treatment.  Pt reports no seizures or dts in past.  Pt denies other substances.     Pt reports some blood in rectum a week ago but cleared up without anything.  Medically cleared    AST 0 - 45 U/L 67 High       ALT 0 - 50 U/L 51 High      ETOH - .073  Covid -Negative  Utox - Negative    A:  Voluntary    R:  3A/[facility]    Added to Worklist    3:39 PM Paged 3A Provider  3:44 PM Manuel accepted for self on 3A/[facility]    Updated Worklist and added to Admit Board    3:50 PM Updated Unit 3A -aiming for 4:30 nurse to nurse/admit    3:52 PM Updated [facility]

## 2021-12-23 NOTE — DISCHARGE INSTRUCTIONS
Return to the emergency department tomorrow morning to check on detox bed availability.    Return sooner if you develop shakiness, have a seizure, begin to vomit, or for other concerns.    If you do not return to the emergency department for admission to detox, follow-up with your primary care doctor soon as possible.

## 2021-12-24 LAB — GGT SERPL-CCNC: 106 U/L (ref 0–40)

## 2021-12-24 PROCEDURE — 99407 BEHAV CHNG SMOKING > 10 MIN: CPT | Performed by: PSYCHIATRY & NEUROLOGY

## 2021-12-24 PROCEDURE — 250N000013 HC RX MED GY IP 250 OP 250 PS 637: Performed by: PSYCHIATRY & NEUROLOGY

## 2021-12-24 PROCEDURE — 99232 SBSQ HOSP IP/OBS MODERATE 35: CPT | Performed by: PHYSICIAN ASSISTANT

## 2021-12-24 PROCEDURE — 250N000013 HC RX MED GY IP 250 OP 250 PS 637: Performed by: PHYSICIAN ASSISTANT

## 2021-12-24 PROCEDURE — 36415 COLL VENOUS BLD VENIPUNCTURE: CPT | Performed by: PSYCHIATRY & NEUROLOGY

## 2021-12-24 PROCEDURE — 82977 ASSAY OF GGT: CPT | Performed by: PSYCHIATRY & NEUROLOGY

## 2021-12-24 PROCEDURE — H2032 ACTIVITY THERAPY, PER 15 MIN: HCPCS

## 2021-12-24 PROCEDURE — 99207 PR CONSULT E&M CHANGED TO SUBSEQUENT LEVEL: CPT | Performed by: PHYSICIAN ASSISTANT

## 2021-12-24 PROCEDURE — 99223 1ST HOSP IP/OBS HIGH 75: CPT | Mod: AI | Performed by: PSYCHIATRY & NEUROLOGY

## 2021-12-24 PROCEDURE — 128N000004 HC R&B CD ADULT

## 2021-12-24 RX ORDER — CITALOPRAM HYDROBROMIDE 40 MG/1
40 TABLET ORAL DAILY
Status: DISCONTINUED | OUTPATIENT
Start: 2021-12-24 | End: 2021-12-25 | Stop reason: HOSPADM

## 2021-12-24 RX ORDER — PANTOPRAZOLE SODIUM 40 MG/1
40 TABLET, DELAYED RELEASE ORAL DAILY
Status: DISCONTINUED | OUTPATIENT
Start: 2021-12-24 | End: 2021-12-25 | Stop reason: HOSPADM

## 2021-12-24 RX ORDER — LAMOTRIGINE 100 MG/1
100 TABLET ORAL DAILY
Status: DISCONTINUED | OUTPATIENT
Start: 2021-12-24 | End: 2021-12-25 | Stop reason: HOSPADM

## 2021-12-24 RX ORDER — DIAZEPAM 5 MG
5-20 TABLET ORAL EVERY 30 MIN PRN
Status: DISCONTINUED | OUTPATIENT
Start: 2021-12-24 | End: 2021-12-25

## 2021-12-24 RX ORDER — METOPROLOL SUCCINATE 25 MG/1
25 TABLET, EXTENDED RELEASE ORAL DAILY
Status: DISCONTINUED | OUTPATIENT
Start: 2021-12-24 | End: 2021-12-25 | Stop reason: HOSPADM

## 2021-12-24 RX ADMIN — CITALOPRAM HYDROBROMIDE 40 MG: 40 TABLET ORAL at 21:50

## 2021-12-24 RX ADMIN — METOPROLOL SUCCINATE 25 MG: 25 TABLET, EXTENDED RELEASE ORAL at 08:59

## 2021-12-24 RX ADMIN — MULTIPLE VITAMINS W/ MINERALS TAB 1 TABLET: TAB at 08:59

## 2021-12-24 RX ADMIN — FOLIC ACID 1 MG: 1 TABLET ORAL at 08:59

## 2021-12-24 RX ADMIN — LAMOTRIGINE 100 MG: 100 TABLET ORAL at 21:50

## 2021-12-24 RX ADMIN — PANTOPRAZOLE SODIUM 40 MG: 40 TABLET, DELAYED RELEASE ORAL at 08:59

## 2021-12-24 RX ADMIN — THIAMINE HCL TAB 100 MG 100 MG: 100 TAB at 08:59

## 2021-12-24 ASSESSMENT — ACTIVITIES OF DAILY LIVING (ADL)
LAUNDRY: WITH SUPERVISION
ORAL_HYGIENE: INDEPENDENT
DRESS: INDEPENDENT
HYGIENE/GROOMING: INDEPENDENT

## 2021-12-24 NOTE — PROGRESS NOTES
12/23/21 1810   Patient Belongings   Did you bring any home meds/supplements to the hospital?  No   Patient Belongings other (see comments)   Patient Belongings Remaining with Patient other (see comments)   Patient Belongings Put in Hospital Secure Location (Security or Locker, etc.) other (see comments)   Belongings Search Yes   Clothing Search Yes   Second Staff Stephanie Pinto I     In med bin:   Cell phone, ciggs and lighter  No security envelope or wallet     Bin:   Vest, writing material, mask, notebook and coffee mug     A               Admission:  I am responsible for any personal items that are not sent to the safe or pharmacy.  [facility] is not responsible for loss, theft or damage of any property in my possession.    Signature:  _________________________________ Date: _______  Time: _____                                              Staff Signature:  ____________________________ Date: ________  Time: _____      2nd Staff person, if patient is unable/unwilling to sign:    Signature: ________________________________ Date: ________  Time: _____     Discharge:  [facility] has returned all of my personal belongings:    Signature: _________________________________ Date: ________  Time: _____                                          Staff Signature:  ____________________________ Date: ________  Time: _____

## 2021-12-24 NOTE — PLAN OF CARE
Problem: Substance Withdrawal  Goal: Substance Withdrawal  Description: Signs and symptoms of listed problems will be absent or manageable.  1. Detoxification from Alcohol using the [facility] ativan protocol  2. Patient will complete assessment paperwork  3. Patient will meet with  to discuss treatment and discharge planning  4. Physical examination and Lab evaluation  5. Patients oral intake will be greater than 75 % of meals to meet estimated needs  6. Adequate fluid intake      Outcome: No Change  Flowsheets (Taken 12/24/2021 1246)  Substance Withdrawal Assessed: all  Substance Withdrawal Present: anxiety       Pt being monitored for alcohol withdrawal. No medications for alcohol withdrawal this shift.  Pt has received a total of 3 mg of ativan since admission.  Pt reports feeling tired today.     Pt is now on the valium protocol per MD orders.    Out on unit. Pt completed CD assessment paperwork.     Pt says her ex-  gave her the phone contact information for the [place].     Denies feeling depressed. Denies SI.   Anxiety level a 2 on a 0-10 severe scale.     Appetite good. + fluid intake.     Pt had visit with the .    BP stabilizing.

## 2021-12-24 NOTE — PROGRESS NOTES
Pt out on unit working on assessment paperwork.     BP stabilized after receiving prn atenolol for elevated pulse.     Ate well for dinner. + fluid intake.

## 2021-12-24 NOTE — PLAN OF CARE
Problem: Substance Withdrawal  Intervention: Substance Withdrawal  Recent Flowsheet Documentation  Taken 12/23/2021 1859 by Rosey Swain RN  Substance Withdrawal Interventions:    interventions implemented as appropriate    monitor substance withdrawal process    encourage nutrition and hydration    assist patient in completing CD Evaluation / Rule 25 Evaluation    assess patient response to medication  Alcohol Withdrawl Interventions:    monitor need for prn medication    establish therapeutic relationship    assist with developing & utilizing healthy coping strategies    build upon strengths    51 year old female admitted for treatment and evaluation of alcohol use and mental health stabilization.   Pt has been drinking off and on since age 14 years old.   Drinking one pint of fireball or one bottle of wine. Pt last used 11- one glass of wine at 2230.   Denies history of DT's or withdrawal seizures.     Pt smokes 8-10 cigarettes a day.     Pt has neve been to detox or CD treatment previously.     Reports a diagnosis of depression. Pt endorses SI  for the past few months but denies plans. She does contract for safety on unit and agrees to notify staff if she has thoughts of self harm.     Pt rates her depression level a 8 and her anxiety level a 5 on a 0-10 severe scale.     Hx of HTN and is on metoprolol at home.     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     Pt is  and has 3 children ages 23, 20 and 17 year old.     BP elevated on admission.     Plan: Status 15, withdrawal precautions. [facility] protocol with ativan. Pt is interested in attending the [place] CD program upon discharge.

## 2021-12-24 NOTE — CONSULTS
"    Internal Medicine Initial Visit      Rosey Tucker MRN# [id number]   YOB: 1970 Age: 51 year old   Date of Admission: 12/23/2021  PCP: Prabha Mata    Referring Provider: Behavioral Health - Domingo Jackson MD  Reason for Visit: General Medical Evaluation, EtOH dependence and withdrawal          Assessment and Recommendations:   Rosey Tucker is a 51 year old year old woman with a history of EtOH use d/o, HTN, anxiety, depression who is admitted to station [facility] with EtOH dependence and withdrawal .        EtOH dependence and withdrawal.   Tox screen: EtOH breath 0.073, urine tox neg. No hx DT or seizures. Pint of fireball x 3yr.   - Management per psychiatry team.  - do not automatically order atenolol, patient is already on metoprolol succinate     BRB per rectum, resolved  Reported Hematemesis-- pt denies  Per ED notes pt with coffee grounds emesis, denies any emesis. She notes 1 week ago having CT A/P for bright red blood per rectum and since that has had normal brown stools. hgb is stable.   - agree with PPI    Trace transamanitis  AST 67, ALT 51 in the setting of above  - repeat LFTs with PCP in 4-6 weeks  - encourage EtOH cessation    HTN  bp is currently stable and expect labile bp in the setting of withdrawal  - manage withdrawal  - continue PTA metoprolol with hold parameters    Medicine will sign off, please page with any additional concerns.     Nereida Shore PA-C  Hospitalist Service   Pager:   [facility] Paging/Directory     Reason for Admission:   EtOH dependence and withdrawal          History of Present Illness:   History is obtained from the patient and medical record.     Rosey Tucker is a 51 year old year old woman with a history of EtOH use d/o, HTN, anxiety, depression who is admitted to station 3A with EtOH dependence and withdrawal    She has been drinking 1 pint of \"fireball\" x 3 daily for ~3 years. No hx of seizures or DT.     Internal Medicine service was asked to see " patient for a general medication evaluation. Currently, patient does not have any specific complaints and reports generally good health. She has a mild lightheadedness that she notes was primarily while reading.           Review of Systems:   Denies N/V, F/C, chest pain, sob, palpitations,  headaches, vision changes, abdominal pain, rashes, lumps, lesions, urinary complaints (including dysuria), changes to bowels (including diarrhea, constipation), bleeding (including epistaxis, bleeding gums, hematuria, melena, hematochezia) or other complaints.     10 point ROS was performed and negative unless otherwise noted in HPI.           Past Medical History:   Reviewed and updated in Epic.  Past Medical History:   Diagnosis Date     Depressive disorder      Hypertension              Past Surgical History:   Reviewed and updated in Epic.  History reviewed. No pertinent surgical history.          Social History:     Social History     Tobacco Use     Smoking status: Current Every Day Smoker     Packs/day: 0.25     Types: Cigarettes     Smokeless tobacco: Never Used   Substance Use Topics     Alcohol use: Yes     Comment: daily     Drug use: Never             Family History:   Reviewed and updated in Epic.  Family History   Problem Relation Age of Onset     Mental Illness Mother      Anxiety Disorder Mother      Depression Mother      Substance Abuse Father      No Known Problems Sister      No Known Problems Maternal Grandmother      No Known Problems Maternal Grandfather      Breast Cancer Paternal Grandmother      No Known Problems Paternal Grandfather      Depression Daughter      No Known Problems Daughter      No Known Problems Maternal Aunt      No Known Problems Paternal Aunt      Depression Other      Hereditary Breast and Ovarian Cancer Syndrome No family hx of      Cancer No family hx of      Colon Cancer No family hx of      Endometrial Cancer No family hx of      Ovarian Cancer No family hx of               "Allergies:     Allergies   Allergen Reactions     Bee Venom Angioedema     1st reaction 7/30/2020     Seasonal Allergies              Medications:     Medications Prior to Admission   Medication Sig Dispense Refill Last Dose     citalopram (CELEXA) 40 MG tablet Take 40 mg by mouth daily   Past Week     lamoTRIgine (LAMICTAL) 100 MG tablet Take 100 mg by mouth daily   12/22/2021 at Unknown time     metoprolol succinate ER (TOPROL-XL) 25 MG 24 hr tablet Take 25 mg by mouth daily   12/22/2021 at Unknown time     pantoprazole (PROTONIX) 40 MG EC tablet Take 40 mg by mouth daily   12/22/2021 at Unknown time     EPINEPHrine (ANY BX GENERIC EQUIV) 0.3 MG/0.3ML injection 2-pack Inject 0.3 mg into the muscle    at Not used         Current Facility-Administered Medications   Medication     atenolol (TENORMIN) tablet 50 mg     folic acid (FOLVITE) tablet 1 mg     LORazepam (ATIVAN) tablet 1-4 mg     multivitamin w/minerals (THERA-VIT-M) tablet 1 tablet     thiamine (B-1) tablet 100 mg            Physical Exam:   Blood pressure (!) 146/93, pulse 74, temperature 97.3  F (36.3  C), temperature source Temporal, resp. rate 14, height 1.575 m (5' 2\"), weight 59 kg (130 lb), SpO2 97 %.  Body mass index is 23.78 kg/m .  GENERAL: Alert and oriented x 3. NAD, ambulatory, cooperative  HEENT: Anicteric sclera. Mucous membranes moist. Pupils equal and round. NC. AT.  CV: RRR. S1, S2. No murmurs appreciated.   RESPIRATORY: Effort normal on room air. Lungs CTAB with no wheezing, rales, rhonchi.   GI: Abdomen soft, non distended, non tender.   MUSCULOSKELETAL: No joint swelling or tenderness.  NEUROLOGICAL: No focal deficits. Moves all extremities.   EXTREMITIES: No peripheral edema. Intact bilateral pedal pulses.   SKIN: No jaundice. No rashes.           Data:   CBC:  Recent Labs   Lab Test 12/23/21  1620   WBC 5.8   RBC 4.47   HGB 15.7   HCT 49.6*   *   MCH 35.1*   MCHC 31.7   RDW 12.0          CMP:  Recent Labs   Lab Test " 12/23/21  1223      POTASSIUM 3.8   CHLORIDE 106   URBANO 9.0   CO2 24   BUN 11   CR 0.62   GLC 88   AST 67*   ALT 51*   BILITOTAL 0.5   ALBUMIN 4.0   PROTTOTAL 7.9   ALKPHOS 86       TSH:  TSH   Date Value Ref Range Status   06/25/2021 1.36 0.30 - 5.00 uIU/mL Final       Tox screen: EtOH breath 0.073, urine tox neg  HCG: neg    Unresulted Labs Ordered in the Past 30 Days of this Admission     No orders found for last 31 day(s).

## 2021-12-24 NOTE — PROGRESS NOTES
"SPIRITUAL HEALTH SERVICES   Spiritual Assessment Progress Note (Behavioral Health/CD Focus)  [facility] ([facility]) 3AW    REFERRAL SOURCE: Pt request for spiritual care on admission.    On this visit, I met with Rosey in the lounge. Shared introduction to spiritual care, brief assessment of spiritual needs/resources.    EXPERIENCE OF ILLNESS/HOSPITALIZATION:  Rosey checked in as \"feeling pretty good\" and spoke of her need to \"find what sticks\" referring to this being her first detox and treatment.     MEANING-MAKING:      SPIRITUALITY/VALUES/Mandaeism:  raised Restorationist, has attended [facility]    COPING/SPIRITUAL PRACTICES: Rosey listed a number of reasons for wanting to bring her shabbir \"back to the forefront:\"  Good community  Messages I learn from, help to be a better person  Not being isolated  Rosey identified the guillaume barriers as \"just being lazy\" and not being a morning person. We explored possibilities of finding a [employer] close to home that has a later/evening service.    SUPPORT SYSTEMS:     PLAN: Spiritual Health remains available at patient's request for the duration of admission.                                                                                                                                             Dolly Rosario MDiv  Associate   Pager [phone]  Office [phone]  [facility] remains available 24/7 for emergent requests/referrals, either by having the switchboard page the on-call  or by entering an ASAP/STAT consult in Epic (this will also page the on-call ). Routine Epic consults receive an initial response within 24 hours.      "

## 2021-12-24 NOTE — PROGRESS NOTES
Rosey was observed and recorded to have slept for 7 hours at night. MSSA score = 3. No safety or behavioral concerns. Will continue to monitor.

## 2021-12-24 NOTE — PROGRESS NOTES
Writer checked in with patient re discharge plans. She states that she has been in contact with [facility] and that she had been told that she could admit after her detox was complete. Per patient's nurse it is possible she could be out of detox this evening but is still adjusting to getting back on her medications and tomorrow would be preferable, if admission is allowed on Christmas Day. Patient signed AYLEEN for [facility] ([phone]). Writer spoke with the women's program and it was verified the patient has been accepted to their program and can admit when she is out of detox and medically cleared for release. [facility] indicated that a safe/sober ride (preferably not a cab) can transport her to their facility, but that nursing staff on 12/25 will need to coordinate an admission time in advance. Patient reports that she has someone that can bring over her belongings to [facility], so they do not need to be dropped off here. Writer passed along this information to nursing staff and provider.

## 2021-12-24 NOTE — PHARMACY-ADMISSION MEDICATION HISTORY
Admission Medication History Completed by Pharmacy    See [facility] Admission Navigator for allergy information, preferred outpatient pharmacy, prior to admission medications and immunization status.     Medication History Sources:   - Patient, and Sure scripts     Changes made to PTA medication list (reason):    Added:  -  Lamotrigine 100 mg tablet   -  Pantoprazole 40 mg EC tablet    Deleted:   - Lamotrigine 100 mg TBDP ODT tab  - Pantoprazole 20 mg EC tablet     Changed:   - Metoprolol 25 mg -> take 1 tablet by mouth daily    Additional Information:  - Lamotrigine (Lamictal) 100 mg tablet: Patient is taking this med differently ( taking one tablet daily) than the prescription order ( take two tablets daily).     Prior to Admission medications    Medication Sig Last Dose Taking? Auth Provider   citalopram (CELEXA) 40 MG tablet Take 40 mg by mouth daily Past Week Yes Unknown, Entered By History   lamoTRIgine (LAMICTAL) 100 MG tablet Take 100 mg by mouth daily 12/22/2021 at Unknown time Yes Unknown, Entered By History   metoprolol succinate ER (TOPROL-XL) 25 MG 24 hr tablet Take 25 mg by mouth daily 12/22/2021 at Unknown time Yes Unknown, Entered By History   pantoprazole (PROTONIX) 40 MG EC tablet Take 40 mg by mouth daily 12/22/2021 at Unknown time Yes Unknown, Entered By History   EPINEPHrine (ANY BX GENERIC EQUIV) 0.3 MG/0.3ML injection 2-pack Inject 0.3 mg into the muscle  at Not used   Reported, Patient         Date completed: 12/23/21    Medication history completed by: SORIN VENTURA ( PD4)

## 2021-12-24 NOTE — H&P
Rosey Tucker is a 51 year old female   History was provided by STEPHANIE who was a fair  historian.   CHIEF COMPLAINT:  Alcohol     HISTORY OF PRESENT ILLNESS:      Patient is a 51-year-old  female, who came to the emergency room after that intervention.  Patient reports that she was a social drinker.  In 2019 she had to move her mother to a nursing home.  That when her drinking increased.  She began to drink daily 1 pint of fireball.  When she stopped drinking she gets anxiety.  Family also noticed that she was drinking and driving  Patient came to the emergency room yesterday for detox however there were no detox bed she went home and continued to drink.  She came again to the emergency room and was admitted for detox.  She has not been eating    Patient has tolerance, withdrawal, progressive use, loss of control, spending more time and more amount than intended. Patient has made attempts to quit, is experiencing cravings, and reports negative consequences.                 Patient does not have a history of seizures.  Patient does not have a history of delirium tremens.               Denies thoughts of suicide or harming others.      Denies auditory or visual hallucinations.     Patient smokes 10cigg-20 cigg /day     Patient denied any gambling    Substance Age first use First became regular or problematic Most recent use            Cannabis none     Cocaine NONE       Stimulants NONE       Opioids NONE       Sedatives NONE       Hallucinogens NONE       Inhalants NONE       Other         OTC drugs NONE       Nicotine         Patient does not have a history of overdose.  Patient does not have a history of IV use.  Patient does not have a history of hepatitis, HIV,  PSYCHIATRIC REVIEW OF SYSTEMS:         Psychiatric Review of Systems:   Depression:   Reports: depressed mood, suicidal ideation no plan , decreased interest, changes in sleep, changes in appetite, guilt, hopelessness, helplessness, impaired  concentration, decreased energy, irritability.   .  [place]:     Denies: sleeplessness, increased goal-directed activities, abrupt increase in energy, pressured speech   impulsiveness, racing thoughts, increased goal-directed activities, pressured speech, increase in energy  [place] Feeling euphoric,Distractible,Impulsive,Grandiose,Talking excessively,Have energy without sleeping,Mood swings,Irritability  Psychosis:     Denies: visual hallucinations, auditory hallucinations, paranoia  Anxiety:   denied excessive worries that are difficult to control for the past 6 months,   Chronic anxiety , not able to stop worrying impacting sleep, poor conc, irritable , muscle tension fatigue    Denied Panic attacks  Pt has following s/o of anxiety  Palpitation pounding heart trembling shaking shortness of breath feeling of choking chest pain nausea feeling dizzy chills numbness derealization fear of dying fear of losing control    Come out of the blue    Denies: worries that are difficult to control for the past 6 months, panic attacks    Social anxiety disorder  Denies: Marked anxiety about 1 or more social situations in which patient is exposed to scrutiny of her father's and patient fears patient will act in the way that she was anxiety symptoms that will be negatively evaluated patient towards the social situation it causes significant impairment  PTSD:   Denies: re-experiencing past trauma, nightmares, increased arousal, avoidance of traumatic stimuli, impaired function.  OCD:     Denies: obsessions, checking, symmetry, cleaning, skin picking.  ED:     Denies: restriction, binging, purging.       patient denies :symptoms of attention deficit disorder include a failure to pay attention to detail, a pattern of careless mistakes, a pattern of inattentive listening, a failure to follow through with projects, poor personal organization, losing necessary objects, distractibility, forgetfulness.                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         Family History:   FAMILY HISTORY:   Family History   Problem Relation Age of Onset     Mental Illness Mother      Anxiety Disorder Mother      Depression Mother      Substance Abuse Father      No Known Problems Sister      No Known Problems Maternal Grandmother      No Known Problems Maternal Grandfather      Breast Cancer Paternal Grandmother      No Known Problems Paternal Grandfather      Depression Daughter      No Known Problems Daughter      No Known Problems Maternal Aunt      No Known Problems Paternal Aunt      Depression Other      Hereditary Breast and Ovarian Cancer Syndrome No family hx of      Cancer No family hx of      Colon Cancer No family hx of      Endometrial Cancer No family hx of      Ovarian Cancer No family hx of                   PTA Medications:     Medications Prior to Admission   Medication Sig Dispense Refill Last Dose     citalopram (CELEXA) 40 MG tablet Take 40 mg by mouth daily   Past Week     lamoTRIgine (LAMICTAL) 100 MG tablet Take 100 mg by mouth daily   12/22/2021 at Unknown time     metoprolol succinate ER (TOPROL-XL) 25 MG 24 hr tablet Take 25 mg by mouth daily   12/22/2021 at Unknown time     pantoprazole (PROTONIX) 40 MG EC tablet Take 40 mg by mouth daily   12/22/2021 at Unknown time     EPINEPHrine (ANY BX GENERIC EQUIV) 0.3 MG/0.3ML injection 2-pack Inject 0.3 mg into the muscle    at Not used           Allergies:     Allergies   Allergen Reactions     Bee Venom Angioedema     1st reaction 7/30/2020     Seasonal Allergies           Labs:      Recent Results (from the past 48 hour(s))   Alcohol breath test POCT    Collection Time: 12/22/21  9:52 PM   Result Value Ref Range    Alcohol Breath Test 0.191 (A) 0.00 - 0.01   Drug abuse screen 1 urine (ED)    Collection Time: 12/22/21 10:48 PM   Result Value Ref Range    Amphetamines Urine Screen Negative Screen Negative    Barbiturates Urine Screen Negative Screen Negative    Benzodiazepines Urine Screen Negative Screen Negative    Cannabinoids Urine Screen Negative Screen Negative    Cocaine Urine Screen Negative Screen Negative    Opiates Urine Screen Negative Screen Negative   Alcohol breath test POCT    Collection Time: 12/23/21  9:24 AM   Result Value Ref Range    Alcohol Breath Test 0.073 (A) 0.00 - 0.01   HCG qualitative urine    Collection Time: 12/23/21 10:44 AM   Result Value Ref Range    hCG Urine Qualitative Negative Negative   Drug abuse screen 1 urine (ED)    Collection Time: 12/23/21 10:44 AM   Result Value Ref Range    Amphetamines Urine Screen Negative Screen Negative    Barbiturates Urine Screen Negative Screen Negative    Benzodiazepines Urine Screen Negative Screen Negative    Cannabinoids Urine Screen Negative Screen Negative    Cocaine Urine Screen Negative Screen Negative    Opiates Urine Screen Negative Screen Negative   Extra Blue Top Tube    Collection Time: 12/23/21 12:22 PM   Result Value Ref Range    Hold Specimen [facility]    Comprehensive metabolic panel    Collection Time: 12/23/21 12:23 PM   Result Value Ref Range    Sodium 139 133 - 144 mmol/L    Potassium 3.8 3.4 - 5.3 mmol/L    Chloride 106 94 - 109 mmol/L    Carbon Dioxide (CO2) 24 20 - 32 mmol/L    Anion Gap 9 3 - 14 mmol/L    Urea Nitrogen 11 7 - 30 mg/dL    Creatinine 0.62 0.52 - 1.04 mg/dL    Calcium 9.0 8.5 - 10.1 mg/dL    Glucose 88 70 - 99 mg/dL    Alkaline Phosphatase 86 40 - 150 U/L    AST 67 (H) 0 - 45 U/L    ALT 51 (H) 0 - 50 U/L    Protein Total 7.9 6.8 - 8.8 g/dL    Albumin 4.0 3.4 - 5.0 g/dL    Bilirubin Total  "0.5 0.2 - 1.3 mg/dL    GFR Estimate >90 >60 mL/min/1.73m2   Magnesium    Collection Time: 12/23/21 12:23 PM   Result Value Ref Range    Magnesium 2.0 1.6 - 2.3 mg/dL   Adult Type and Screen    Collection Time: 12/23/21 12:23 PM   Result Value Ref Range    ABO/RH(D) AB NEG     Antibody Screen Negative Negative    SPECIMEN EXPIRATION DATE [id number]    Asymptomatic COVID-19 Virus (Coronavirus) by PCR Nasopharyngeal    Collection Time: 12/23/21 12:24 PM    Specimen: Nasopharyngeal; Swab   Result Value Ref Range    SARS CoV2 PCR Negative Negative   CBC with platelets and differential    Collection Time: 12/23/21  4:20 PM   Result Value Ref Range    WBC Count 5.8 4.0 - 11.0 10e3/uL    RBC Count 4.47 3.80 - 5.20 10e6/uL    Hemoglobin 15.7 11.7 - 15.7 g/dL    Hematocrit 49.6 (H) 35.0 - 47.0 %     (H) 78 - 100 fL    MCH 35.1 (H) 26.5 - 33.0 pg    MCHC 31.7 31.5 - 36.5 g/dL    RDW 12.0 10.0 - 15.0 %    Platelet Count 241 150 - 450 10e3/uL    % Neutrophils 50 %    % Lymphocytes 38 %    % Monocytes 9 %    % Eosinophils 2 %    % Basophils 1 %    % Immature Granulocytes 0 %    NRBCs per 100 WBC 0 <1 /100    Absolute Neutrophils 2.9 1.6 - 8.3 10e3/uL    Absolute Lymphocytes 2.2 0.8 - 5.3 10e3/uL    Absolute Monocytes 0.5 0.0 - 1.3 10e3/uL    Absolute Eosinophils 0.1 0.0 - 0.7 10e3/uL    Absolute Basophils 0.1 0.0 - 0.2 10e3/uL    Absolute Immature Granulocytes 0.0 <=0.4 10e3/uL    Absolute NRBCs 0.0 10e3/uL         BP (!) 146/93   Pulse 74   Temp 97.3  F (36.3  C) (Temporal)   Resp 14   Ht 1.575 m (5' 2\")   Wt 59 kg (130 lb)   SpO2 97%   BMI 23.78 kg/m    Weight is 130 lbs 0 oz  Body mass index is 23.78 kg/m .    Physical Exam:     ROS: 10 point ROS neg other than the symptoms noted above in the HPI.            Past Medical History:   PAST MEDICAL HISTORY:   Past Medical History:   Diagnosis Date     Depressive disorder      Hypertension        PAST SURGICAL HISTORY: History reviewed. No pertinent surgical " history.    -    -           MENTAL STATUS EXAM:      Constitutional: General appearance of patient:  Appearance:  awake, alert, appeared as age stated, adequate groomed and slightly unkempt  Attitude:  cooperative  Eye Contact:  good  Mood:  good  Affect:  congruent   Speech:  clear, coherent normal rate   Psychomotor Behavior:  no evidence of tardive dyskinesia, dystonia, or tics  Thought Process:  logical, linear and goal oriented  Associations:  no loose associations  Thought Content:  no evidence of psychotic thought and active suicidal ideation present  Denied any active suicidal /homicidation ideation plan intent   Insight:  fair  Judgment:  fair  Oriented to:  time, person, and place  Attention Span and Concentration:  intact  Recent and Remote Memory:  intact  Language:  english with appropriate syntax and vocabulary  Fund of Knowledge: appropriate  Muscle Strength and Tone: normal  Gait and Station: Normal     There are no abnormal or psychotic thoughts, no preoccupations, no overvalued ideas, no rumination, no obsessions, no compulsions, no somatic concerns, no hypochrondriasis, no ideas of reference, and no delusions.  Patient denies homicidal thoughts.   Patient denies suicidal thoughts.  Patient appears to have good judgment and good insight.     Musculoskeletal: Patient shows no abnormalities of motor activity: there is no tremor, no tic, and no dystonia.  There is no apparent muscle atrophy, strength and tone appear normal, and there are no abnormal movements.  Patient has normal gait and stance.    DISCUSSION:         Assessment:       Patient has a biological predisposition with family history positive for mental health and chemical dependency  Psychologically patient is experiencing abusing alcohol experiencing neurovegetative symptoms of depression patient abuses nicotine has neurovegetative symptoms of depression she had passive suicide ideation 1 week ago  Patient has these particular stressors  family is upset about her drinking patient reports he is drinking and driving  Patient has chronic illness exacerbation leading to hospitalization progression as described.     Patient has been unable to stop using drugs in the community due to both physical and psychological symptoms.  Continued use will put the patient at risk for medical and/or psychiatric complications.      Inpatient psychiatric hospitalization is warranted at this time for safety, stabilization, and possible adjustment in medications.       Diagnoses:    Alcohol use disorder severe   Alcohol withdrawal severe  Major depressive disorder moderate recurrent without psychosis  Nicotine use disorder moderate       Plan:   Problem list  1#alcohol use disorder severe alcohol withdrawal severe patient will be switched to Valium from Ativan for detox     - [facility] protocol using Valium for management of alcohol withdrawal  Patient has sweats elevated blood pressure 146/91 shaky  - Continue thiamine, folate, and multivitamin daily    2#4 depression patient will continue Celexa 40 mg and lamotrigine 100 mg    3#liver enzymes are slightly elevated AST 67 ALT 51 most likely from alcoholism nonviral suspected we will put internal medicine consult  4#GGT ordered  5#nicotine replacement ordered      - Consider anti-craving medications prior to discharge. Pt willing to review additional information about both naltrexone and Antabuse.    Alcohol withdrawal nausea prn Zofran as needed for nausea     hydroxyzine 25 mg q4h prn for acute anxiety  Trazodone 50 mg at bedtime prn for sleep disturbances       Patient has been unable to stop using drugs in the community due to both physical and psychological symptoms.  Continued use will put the patient at risk for medical and/or psychiatric complications.    I HAVE REVIEWED LABS WITH PT AND TALKED ABOUT RESULTS WITH PT  I HAVE REVIEWED AND SUMMARIZED OLD RECORDS including his medication reconcilation of his home  medications  and PDMP   I HAVE SPOKEN WITH RN ABOUT MEDICATIONS AND DETOX SCORES  I HAVE SPOKEN WITH CM ABOUT PTS TREATMENT OPTIONS     Discussed in detail about patient's smoking patient was advised to quit patient was told about the impact of smoking.  Patient's willingness to quit was assessed.  I provided methods and skills for cessation including medication management nicotine gum patch.  Patient did not set a quit date.  Patient is interested in quitting .we discussed pharmacotherapy options .patient agreed to take nicotine gum patch lozenge.  We discussed behavioral change techniques when craving nicotine including deep breathing drinking glass of water, taking a walk.  This discussion was about 15 minutes          Laboratory/Imaging:    Liver Function Studies -   Recent Labs   Lab Test 12/23/21  1223   PROTTOTAL 7.9   ALBUMIN 4.0   BILITOTAL 0.5   ALKPHOS 86   AST 67*   ALT 51*      Last Comprehensive Metabolic Panel:  Sodium   Date Value Ref Range Status   12/23/2021 139 133 - 144 mmol/L Final     Potassium   Date Value Ref Range Status   12/23/2021 3.8 3.4 - 5.3 mmol/L Final     Chloride   Date Value Ref Range Status   12/23/2021 106 94 - 109 mmol/L Final     Carbon Dioxide (CO2)   Date Value Ref Range Status   12/23/2021 24 20 - 32 mmol/L Final     Anion Gap   Date Value Ref Range Status   12/23/2021 9 3 - 14 mmol/L Final     Glucose   Date Value Ref Range Status   12/23/2021 88 70 - 99 mg/dL Final     Urea Nitrogen   Date Value Ref Range Status   12/23/2021 11 7 - 30 mg/dL Final     Creatinine   Date Value Ref Range Status   12/23/2021 0.62 0.52 - 1.04 mg/dL Final     GFR Estimate   Date Value Ref Range Status   12/23/2021 >90 >60 mL/min/1.73m2 Final     Comment:     Effective December 21, 2021 eGFRcr in adults is calculated using the 2021 CKD-EPI creatinine equation which includes age and gender (Sunita batista al., NEJM, DOI: 10.1056/CIRRhh3044944)   06/25/2021 >60 >60 mL/min/1.73m2 Final     Calcium   Date  "Value Ref Range Status   12/23/2021 9.0 8.5 - 10.1 mg/dL Final     Bilirubin Total   Date Value Ref Range Status   12/23/2021 0.5 0.2 - 1.3 mg/dL Final     Alkaline Phosphatase   Date Value Ref Range Status   12/23/2021 86 40 - 150 U/L Final     ALT   Date Value Ref Range Status   12/23/2021 51 (H) 0 - 50 U/L Final     AST   Date Value Ref Range Status   12/23/2021 67 (H) 0 - 45 U/L Final                   Medical treatment/interventions:  Medical concerns: As above    - Consults: IM consult placed. Appreciate assistance.     Legal Status: Voluntary     Safety Assessment:   Checks: Status 15  Pt has not required locked seclusion or restraints in the past 24 hours to maintain safety, please refer to RN documentation for further details.    The risks, benefits, alternatives and side effects have been discussed and are understood by the patient.       Patient will be treated in therapeutic milieu with appropriate individual and group therapies as described.  Disposition: Pending clinical stabilization. Pt does  appear interested in COMPLETE DETOX AND DO TRT  Length of stay 3-5 days        \"Much or all of the text in this note was generated through the use of Dragon Dictate voice to text software. Errors in spelling or words which appear to be out of contact are unintentional, may be present due having escaped editing\"     "

## 2021-12-24 NOTE — PLAN OF CARE
Behavioral Team Discussion: (12/24/2021)    Continued Stay Criteria/Rationale: Patient admitted for Chemical Use Issues.  Plan: The following services will be provided to the patient; psychiatric assessment, medication management, therapeutic milieu, individual and group support, and skills groups.   Participants: 3A Provider: Dr. Juan David Lafleur MD; 3A RN: Rosey Swain RN; 3A CM's: Mahsa Serna.  Summary/Recommendation: Providers will assess today for treatment recommendations, discharge planning, and aftercare plans. CM will meet with pt for discharge planning.   Medical/Physical: IM Consult completed on 12/24/21: Trace transamanitis, Hypertension    Precautions:   Behavioral Orders   Procedures     Code 1 - Restrict to Unit     Fall precautions     Routine Programming     As clinically indicated     Seizure precautions     Status 15     Every 15 minutes.     Withdrawal precautions     Rationale for change in precautions or plan: N/A  Progress: Initial.

## 2021-12-25 VITALS
SYSTOLIC BLOOD PRESSURE: 135 MMHG | RESPIRATION RATE: 16 BRPM | BODY MASS INDEX: 23.92 KG/M2 | HEART RATE: 73 BPM | OXYGEN SATURATION: 95 % | WEIGHT: 130 LBS | DIASTOLIC BLOOD PRESSURE: 93 MMHG | HEIGHT: 62 IN | TEMPERATURE: 97.4 F

## 2021-12-25 PROCEDURE — 250N000013 HC RX MED GY IP 250 OP 250 PS 637: Performed by: PHYSICIAN ASSISTANT

## 2021-12-25 PROCEDURE — 250N000013 HC RX MED GY IP 250 OP 250 PS 637: Performed by: PSYCHIATRY & NEUROLOGY

## 2021-12-25 PROCEDURE — 99239 HOSP IP/OBS DSCHRG MGMT >30: CPT | Performed by: CLINICAL NURSE SPECIALIST

## 2021-12-25 RX ORDER — LAMOTRIGINE 100 MG/1
100 TABLET ORAL DAILY
Qty: 30 TABLET | Refills: 0 | Status: SHIPPED | OUTPATIENT
Start: 2021-12-25

## 2021-12-25 RX ORDER — PANTOPRAZOLE SODIUM 40 MG/1
40 TABLET, DELAYED RELEASE ORAL DAILY
Qty: 30 TABLET | Refills: 0 | Status: SHIPPED | OUTPATIENT
Start: 2021-12-25

## 2021-12-25 RX ORDER — CITALOPRAM HYDROBROMIDE 40 MG/1
40 TABLET ORAL DAILY
Qty: 30 TABLET | Refills: 0 | Status: SHIPPED | OUTPATIENT
Start: 2021-12-25

## 2021-12-25 RX ORDER — MULTIPLE VITAMINS W/ MINERALS TAB 9MG-400MCG
1 TAB ORAL DAILY
Qty: 30 TABLET | Refills: 0 | Status: SHIPPED | OUTPATIENT
Start: 2021-12-26

## 2021-12-25 RX ORDER — METOPROLOL SUCCINATE 25 MG/1
25 TABLET, EXTENDED RELEASE ORAL DAILY
Qty: 30 TABLET | Refills: 0 | Status: SHIPPED | OUTPATIENT
Start: 2021-12-25

## 2021-12-25 RX ADMIN — METOPROLOL SUCCINATE 25 MG: 25 TABLET, EXTENDED RELEASE ORAL at 08:18

## 2021-12-25 RX ADMIN — FOLIC ACID 1 MG: 1 TABLET ORAL at 08:18

## 2021-12-25 RX ADMIN — PANTOPRAZOLE SODIUM 40 MG: 40 TABLET, DELAYED RELEASE ORAL at 08:18

## 2021-12-25 RX ADMIN — MULTIPLE VITAMINS W/ MINERALS TAB 1 TABLET: TAB at 08:18

## 2021-12-25 RX ADMIN — THIAMINE HCL TAB 100 MG 100 MG: 100 TAB at 08:18

## 2021-12-25 ASSESSMENT — ENCOUNTER SYMPTOMS
DYSURIA: 0
FLANK PAIN: 0
ABDOMINAL PAIN: 0
FEVER: 0
SHORTNESS OF BREATH: 0

## 2021-12-25 NOTE — PLAN OF CARE
Nursing Assessment    Pt had positive shift. Pt maintains full range affect, mood is calm. Visible and participative in the milieu . Pt did attend group this shift. During check in, pt noted wanting to discharge to [facility] tomorrow and feel comfortable with this plan. Pt denies all mental health symptoms including SI/SIB/HI/AVH. Pt contracts for safety on the unit. No other behavioral concerns at this time.      MSSA score 3 at 2000 -- Pt is out of detox    Pt took scheduled medications without issue. Pt did not require any PRNs this shift.    Pt denies any other concerns at this time. Will continue to monitor and support.

## 2021-12-25 NOTE — PLAN OF CARE
"Music Therapy Group note    Clinical Hours in session: 1.0    Number of patients in group: 5    Scope of service: psychodynamic    Intervention: \"Hold On\"     Goal of group: written and verbal self-reflection     Patient response/reaction to treatment intervention(s): Music Therapist shared live, original songs with themes of transition, letting go, and belonging.  Pts were given reflection sheets with topics to share on in written and verbal formats.  Pt was calm, cooperative and engaged in the process, sharing where they were at.  Rosey wrote minimally on her sheets, but did share and did appear to be tracking group process well.    Kristin Wynn, [facility]  Board-Certified Music Therapist           "

## 2021-12-25 NOTE — DISCHARGE INSTRUCTIONS
Behavioral Discharge Planning and Instructions  THANK YOU FOR CHOOSING THE [address]    Summary: You were admitted to [address] [place] [address] on treatment and evaluation of alcohol use. You are being discharged today and the treatment teams recommendations are: Admit to [facility] after completing medical detox, and follow all aftercare recommendations.     Recommendation:   [facility] ([phone]).   [address], [place] [address]    Main Diagnoses: Per Dr. Jackson psychiatrist    Alcohol use disorder severe   Alcohol withdrawal severe  Major depressive disorder moderate recurrent without psychosis  Nicotine use disorder moderate      Major Treatments, Procedures and Findings: You alcohol withdrawal was treated with ativan using the Modified Selective Severity Assessment (MSSA) protocol    You were followed by Psychiatry and Medical. You met with Case Management to complete a chemical health assessment and for discharge planning. You were given a copy of your lab results which were reviewed with you. Please bring your lab results with you to your primary follow up appointment. Make your recovery a priority, Rosey     Symptoms to Report: If  you experience feeling more anxiety, confusion, losing more sleep, mood declining or thoughts of suicide, notify your treatment team or notify your primary physician. IF ANY OF THE SYMPTOMS YOU ARE EXPERIENCING ARE A MEDICAL EMERGENCY CALL 911 IMMEDIATELY.    Lifestyle Adjustment: Health Action Plan:  1.Create a daily schedule  2. Eat Healthy  3. Plan Enjoyable Sober Activities  4. Use Problem Solving Skills and Deal with Issues as they Arise.   5. Be Physically Active  6. Take your medications as prescribed  7. Get enough restful sleep  8. Practice Relaxation  9. Spend time with Supportive People  10. No use of alcohol, illegal drugs or addictive medications other than what is currently prescribed.   11.AA, NA Sponsor  are excellent resources for support  12. Explore how  you can utilize spirituality in your recovery      Medical Provider Follow Up:  You are aware you should make a follow up appointment with your primary care doctor for medical and medication management    Support Group:  AA/NA and Sponsor contact/support    Resources:                   Great Pod casts for nutrition and wellness  Listen on Apple Podcasts  Dishing Up Nutrition   [employer]   Nutrition       Understand the connection between what you eat and how you feel. Hosted by licensed nutritionists and dietitians from [employer] we share practical, real-life solutions for healthier living through nutrition.       Recovery apps for your phone to locate current in person and zoom recovery meetings  [place] - meeting sahara  AA  - meeting sahara  Meeting guide - meeting sahara  Quick NA meeting - meeting sahara  [place]- has various apps      Crisis Intervention: 807.603.3123 or 765-488-1729 (TTY: 981.235.3380).  Call anytime for help.  Suicide Awareness Voices of Education (SAVE) (www.save.org): 657-370-GHQL (6479)  National Suicide Prevention Line (www.mentalhealthmn.org): 541-540-SYPE (7149)  National [place] on Mental Illness (www.mn.shamika.org): 169.364.3797 or 076-304-3485.  Alcoholics Anonymous (www.alcoholics-anonymous.org): Or local information can be found 24 hours a day at:   AA Intergroup service office in [place] (http://www.aastpaul.org/) 528.817.6563  AA Intergroup service office in [facility]: 759.185.3860. (http://www.a[place].org/)  Narcotics Anonymous (www.nam[place].org)6-311-623-6098   [facility] Behavioral Intake [phone]    [place] Recovery Connection (MRC)  [facility] connects people seeking recovery to resources that help foster and sustain long-term recovery.  Whether you are seeking resources for treatment, transportation, housing, job training, education,  health care or other pathways to recovery, [facility] is a great place to start.  [phone]. Www[facility]org    General Medication Instructions:   See your medication sheet(s) for instructions. Take all medicines as directed.  Make no changes unless your doctor directs them. Go to all your doctor visits. Be sure to have all your required lab tests. This way, your medicines can be refilled in timely fashion. Do not use any drugs not prescribed by your provider. AA/NA and Sponsors are excellent resources for support. Avoid alcohol.    Please Note: If you have any questions at anytime after you are discharged please call the [facility] [place] detox unit 3AW unit at [phone].  [facility], Behavioral Intake [phone]  Please take this discharge folder with you to all your follow up appointments, it contains your lab results, diagnosis, medication list and discharge recommendations.      THANK YOU FOR CHOOSING THE [facility]

## 2021-12-25 NOTE — PLAN OF CARE
Pt verbalized complete understanding of all discharge instructions. Pt discharged to [facility] transported by her ex-.

## 2021-12-25 NOTE — DISCHARGE SUMMARY
Psychiatric Discharge Summary    Rosey Tucker MRN# [id number]   Age: 51 year old YOB: 1970     Date of Admission:  12/23/2021  Date of Discharge:  12/25/2021  Admitting Physician:  Domingo Jackson MD  Discharge Physician:  Debra A. Naegele, APRN CNS (Contact: [phone])         Event Leading to Hospitalization:   Patient is a 51-year-old  female, who came to the emergency room after that intervention.  Patient reports that she was a social drinker.  In 2019 she had to move her mother to a nursing home.  That when her drinking increased.  She began to drink daily 1 pint of fireball.  When she stopped drinking she gets anxiety.  Family also noticed that she was drinking and driving  Patient came to the emergency room yesterday for detox however there were no detox bed she went home and continued to drink.  She came again to the emergency room and was admitted for detox.  She has not been eating     Patient has tolerance, withdrawal, progressive use, loss of control, spending more time and more amount than intended. Patient has made attempts to quit, is experiencing cravings, and reports negative consequences.                       Patient does not have a history of seizures.  Patient does not have a history of delirium tremens.       See Admission note by Domingo Jackson MD on 12/24/2021   for additional details.          DIagnoses:     Alcohol use disorder severe   Alcohol withdrawal severe  Major depressive disorder moderate recurrent without psychosis  Nicotine use disorder moderate         Labs:     Results for orders placed or performed during the hospital encounter of 12/23/21   HCG qualitative urine     Status: Normal   Result Value Ref Range    hCG Urine Qualitative Negative Negative   Drug abuse screen 1 urine (ED)     Status: Normal   Result Value Ref Range    Amphetamines Urine Screen Negative Screen Negative    Barbiturates Urine Screen Negative Screen Negative     Benzodiazepines Urine Screen Negative Screen Negative    Cannabinoids Urine Screen Negative Screen Negative    Cocaine Urine Screen Negative Screen Negative    Opiates Urine Screen Negative Screen Negative   Comprehensive metabolic panel     Status: Abnormal   Result Value Ref Range    Sodium 139 133 - 144 mmol/L    Potassium 3.8 3.4 - 5.3 mmol/L    Chloride 106 94 - 109 mmol/L    Carbon Dioxide (CO2) 24 20 - 32 mmol/L    Anion Gap 9 3 - 14 mmol/L    Urea Nitrogen 11 7 - 30 mg/dL    Creatinine 0.62 0.52 - 1.04 mg/dL    Calcium 9.0 8.5 - 10.1 mg/dL    Glucose 88 70 - 99 mg/dL    Alkaline Phosphatase 86 40 - 150 U/L    AST 67 (H) 0 - 45 U/L    ALT 51 (H) 0 - 50 U/L    Protein Total 7.9 6.8 - 8.8 g/dL    Albumin 4.0 3.4 - 5.0 g/dL    Bilirubin Total 0.5 0.2 - 1.3 mg/dL    GFR Estimate >90 >60 mL/min/1.73m2   Magnesium     Status: Normal   Result Value Ref Range    Magnesium 2.0 1.6 - 2.3 mg/dL   Asymptomatic COVID-19 Virus (Coronavirus) by PCR Nasopharyngeal     Status: Normal    Specimen: Nasopharyngeal; Swab   Result Value Ref Range    SARS CoV2 PCR Negative Negative    Narrative    Testing was performed using the adrienne  SARS-CoV-2 & Influenza A/B Assay on the adrienne  [employer]  System.  This test should be ordered for the detection of SARS-COV-2 in individuals who meet SARS-CoV-2 clinical and/or epidemiological criteria. Test performance is unknown in asymptomatic patients.  This test is for in vitro diagnostic use under the FDA EUA for laboratories certified under CLIA to perform moderate and/or high complexity testing. This test has not been FDA cleared or approved.  A negative test does not rule out the presence of PCR inhibitors in the specimen or target RNA in concentration below the limit of detection for the assay. The possibility of a false negative should be considered if the patient's recent exposure or clinical presentation suggests COVID-19.  [facility] [employer] are certified under the Clinical  Laboratory Improvement Amendments of 1988 (CLIA-88) as qualified to perform moderate and/or high complexity laboratory testing.   CBC with platelets and differential     Status: Abnormal   Result Value Ref Range    WBC Count 5.8 4.0 - 11.0 10e3/uL    RBC Count 4.47 3.80 - 5.20 10e6/uL    Hemoglobin 15.7 11.7 - 15.7 g/dL    Hematocrit 49.6 (H) 35.0 - 47.0 %     (H) 78 - 100 fL    MCH 35.1 (H) 26.5 - 33.0 pg    MCHC 31.7 31.5 - 36.5 g/dL    RDW 12.0 10.0 - 15.0 %    Platelet Count 241 150 - 450 10e3/uL    % Neutrophils 50 %    % Lymphocytes 38 %    % Monocytes 9 %    % Eosinophils 2 %    % Basophils 1 %    % Immature Granulocytes 0 %    NRBCs per 100 WBC 0 <1 /100    Absolute Neutrophils 2.9 1.6 - 8.3 10e3/uL    Absolute Lymphocytes 2.2 0.8 - 5.3 10e3/uL    Absolute Monocytes 0.5 0.0 - 1.3 10e3/uL    Absolute Eosinophils 0.1 0.0 - 0.7 10e3/uL    Absolute Basophils 0.1 0.0 - 0.2 10e3/uL    Absolute Immature Granulocytes 0.0 <=0.4 10e3/uL    Absolute NRBCs 0.0 10e3/uL   Extra Blue Top Tube     Status: None   Result Value Ref Range    Hold Specimen JIC    GGT     Status: Abnormal   Result Value Ref Range     (H) 0 - 40 U/L   Internal Medicine Adult IP Consult for BEH Detox on 3A: Patient to be seen: Routine within 24 hrs; Call back #: 3; a; Consultant may enter orders: Yes; Requesting provider? Hospitalist (if different from attending physician)     Status: None ()    Narrative    Nereida Shore PA-C     12/24/2021 11:58 AM      Internal Medicine Initial Visit      Rosey Tucker MRN# [id number]   YOB: 1970 Age: 51 year old   Date of Admission: 12/23/2021  PCP: Prabha Mata    Referring Provider: Behavioral Health - Domingo Jackson MD  Reason for Visit: General Medical Evaluation, EtOH dependence and   withdrawal          Assessment and Recommendations:   Rosey Tucker is a 51 year old year old woman with a history of   EtOH use d/o, HTN, anxiety, depression who is admitted to  "station   3A with EtOH dependence and withdrawal .        EtOH dependence and withdrawal.   Tox screen: EtOH breath 0.073, urine tox neg. No hx DT or   seizures. Pint of fireball x 3yr.   - Management per psychiatry team.  - do not automatically order atenolol, patient is already on   metoprolol succinate     BRB per rectum, resolved  Reported Hematemesis-- pt denies  Per ED notes pt with coffee grounds emesis, denies any emesis.   She notes 1 week ago having CT A/P for bright red blood per   rectum and since that has had normal brown stools. hgb is stable.     - agree with PPI    Trace transamanitis  AST 67, ALT 51 in the setting of above  - repeat LFTs with PCP in 4-6 weeks  - encourage EtOH cessation    HTN  bp is currently stable and expect labile bp in the setting of   withdrawal  - manage withdrawal  - continue PTA metoprolol with hold parameters    Medicine will sign off, please page with any additional concerns.       Nereida Sohre PA-C  Hospitalist Service   Pager:   [facility] Paging/Directory     Reason for Admission:   EtOH dependence and withdrawal          History of Present Illness:   History is obtained from the patient and medical record.     Rosey Tucker is a 51 year old year old woman with a history of   EtOH use d/o, HTN, anxiety, depression who is admitted to station   3A with EtOH dependence and withdrawal    She has been drinking 1 pint of \"fireball\" x 3 daily for ~3   years. No hx of seizures or DT.     Internal Medicine service was asked to see patient for a general   medication evaluation. Currently, patient does not have any   specific complaints and reports generally good health. She has a   mild lightheadedness that she notes was primarily while reading.           Review of Systems:   Denies N/V, F/C, chest pain, sob, palpitations,  headaches,   vision changes, abdominal pain, rashes, lumps, lesions, urinary   complaints (including dysuria), changes to bowels (including   diarrhea, " constipation), bleeding (including epistaxis, bleeding   gums, hematuria, melena, hematochezia) or other complaints.     10 point ROS was performed and negative unless otherwise noted in   HPI.           Past Medical History:   Reviewed and updated in Epic.  Past Medical History:   Diagnosis Date     Depressive disorder      Hypertension              Past Surgical History:   Reviewed and updated in Epic.  History reviewed. No pertinent surgical history.          Social History:     Social History     Tobacco Use     Smoking status: Current Every Day Smoker     Packs/day: 0.25     Types: Cigarettes     Smokeless tobacco: Never Used   Substance Use Topics     Alcohol use: Yes     Comment: daily     Drug use: Never             Family History:   Reviewed and updated in Epic.  Family History   Problem Relation Age of Onset     Mental Illness Mother      Anxiety Disorder Mother      Depression Mother      Substance Abuse Father      No Known Problems Sister      No Known Problems Maternal Grandmother      No Known Problems Maternal Grandfather      Breast Cancer Paternal Grandmother      No Known Problems Paternal Grandfather      Depression Daughter      No Known Problems Daughter      No Known Problems Maternal Aunt      No Known Problems Paternal Aunt      Depression Other      Hereditary Breast and Ovarian Cancer Syndrome No family hx of      Cancer No family hx of      Colon Cancer No family hx of      Endometrial Cancer No family hx of      Ovarian Cancer No family hx of              Allergies:     Allergies   Allergen Reactions     Bee Venom Angioedema     1st reaction 7/30/2020     Seasonal Allergies              Medications:     Medications Prior to Admission   Medication Sig Dispense Refill Last Dose     citalopram (CELEXA) 40 MG tablet Take 40 mg by mouth daily     Past Week     lamoTRIgine (LAMICTAL) 100 MG tablet Take 100 mg by mouth daily     12/22/2021 at Unknown time     metoprolol succinate ER (TOPROL-XL)  "25 MG 24 hr tablet Take 25   mg by mouth daily   12/22/2021 at Unknown time     pantoprazole (PROTONIX) 40 MG EC tablet Take 40 mg by mouth   daily   12/22/2021 at Unknown time     EPINEPHrine (ANY BX GENERIC EQUIV) 0.3 MG/0.3ML injection   2-pack Inject 0.3 mg into the muscle    at Not used         Current Facility-Administered Medications   Medication     atenolol (TENORMIN) tablet 50 mg     folic acid (FOLVITE) tablet 1 mg     LORazepam (ATIVAN) tablet 1-4 mg     multivitamin w/minerals (THERA-VIT-M) tablet 1 tablet     thiamine (B-1) tablet 100 mg            Physical Exam:   Blood pressure (!) 146/93, pulse 74, temperature 97.3  F (36.3    C), temperature source Temporal, resp. rate 14, height 1.575 m   (5' 2\"), weight 59 kg (130 lb), SpO2 97 %.  Body mass index is 23.78 kg/m .  GENERAL: Alert and oriented x 3. NAD, ambulatory, cooperative  HEENT: Anicteric sclera. Mucous membranes moist. Pupils equal and   round. NC. AT.  CV: RRR. S1, S2. No murmurs appreciated.   RESPIRATORY: Effort normal on room air. Lungs CTAB with no   wheezing, rales, rhonchi.   GI: Abdomen soft, non distended, non tender.   MUSCULOSKELETAL: No joint swelling or tenderness.  NEUROLOGICAL: No focal deficits. Moves all extremities.   EXTREMITIES: No peripheral edema. Intact bilateral pedal pulses.   SKIN: No jaundice. No rashes.           Data:   CBC:  Recent Labs   Lab Test 12/23/21  1620   WBC 5.8   RBC 4.47   HGB 15.7   HCT 49.6*   *   MCH 35.1*   MCHC 31.7   RDW 12.0          CMP:  Recent Labs   Lab Test 12/23/21  1223      POTASSIUM 3.8   CHLORIDE 106   URBANO 9.0   CO2 24   BUN 11   CR 0.62   GLC 88   AST 67*   ALT 51*   BILITOTAL 0.5   ALBUMIN 4.0   PROTTOTAL 7.9   ALKPHOS 86       TSH:  TSH   Date Value Ref Range Status   06/25/2021 1.36 0.30 - 5.00 uIU/mL Final       Tox screen: EtOH breath 0.073, urine tox neg  HCG: neg    Unresulted Labs Ordered in the Past 30 Days of this Admission     No orders found for last " 31 day(s).              Alcohol breath test POCT     Status: Abnormal   Result Value Ref Range    Alcohol Breath Test 0.073 (A) 0.00 - 0.01   Adult Type and Screen     Status: None   Result Value Ref Range    ABO/RH(D) AB NEG     Antibody Screen Negative Negative    SPECIMEN EXPIRATION DATE [id number]    Urine Drugs of Abuse Screen     Status: Normal    Narrative    The following orders were created for panel order Urine Drugs of Abuse Screen.  Procedure                               Abnormality         Status                     ---------                               -----------         ------                     Drug abuse screen 1 urin...[[id number]]  Normal              Final result                 Please view results for these tests on the individual orders.   CBC with platelets differential     Status: Abnormal    Narrative    The following orders were created for panel order CBC with platelets differential.  Procedure                               Abnormality         Status                     ---------                               -----------         ------                     CBC with platelets and d...[[id number]]  Abnormal            Final result                 Please view results for these tests on the individual orders.   ABO/Rh type and screen     Status: None    Narrative    The following orders were created for panel order ABO/Rh type and screen.  Procedure                               Abnormality         Status                     ---------                               -----------         ------                     Adult Type and Screen[[id number]]                            Final result                 Please view results for these tests on the individual orders.   Extra Tube     Status: None    Narrative    The following orders were created for panel order Extra Tube.  Procedure                               Abnormality         Status                     ---------                                -----------         ------                     Extra Blue Top Tube[[id number]]                              Final result                 Please view results for these tests on the individual orders.            Consults:   Consultation during this admission received from internal medicine         Hospital Course:   Rosey Tucker was admitted to Station 3A with attending Juan David Lafleur MD as a voluntary patient. The patient was placed under status 15 (15 minute checks) to ensure patient safety.       MSSA protocol was initiated due to the patient's history of alcohol abuse and concern for withdrawal symptoms.    Patient was continued on citalopram 40 mg to address depressive and anxiety symptoms.     Rosey Tucker did participate in groups and was visible in the milieu.     The patient's symptoms of alcohol withdrawal resolved.  Patient reports improved mood and denies suicidal ideation. She was continued on Lamictal 100 mg to address mood instability and depressive symptoms. Provider explained Chris-Owen's syndrome. Metoprolol ER 25 mg was continued for hypertension and Protonix for GERD. Discussed risks, benefits and side effects of medication to patient. .     [facility] At the time of discharge Rosey Tucker was determined to not be a danger to herself or others.          Discharge Medications:     Current Discharge Medication List      START taking these medications    Details   multivitamin w/minerals (THERA-VIT-M) tablet Take 1 tablet by mouth daily  Qty: 30 tablet, Refills: 0    Associated Diagnoses: Vitamin deficiency         CONTINUE these medications which have CHANGED    Details   citalopram (CELEXA) 40 MG tablet Take 1 tablet (40 mg) by mouth daily  Qty: 30 tablet, Refills: 0    Associated Diagnoses: Major depressive disorder, recurrent episode, moderate (H)      lamoTRIgine (LAMICTAL) 100 MG tablet Take 1 tablet (100 mg) by mouth daily  Qty:  30 tablet, Refills: 0    Associated Diagnoses: Major depressive disorder, recurrent episode, moderate (H)      metoprolol succinate ER (TOPROL-XL) 25 MG 24 hr tablet Take 1 tablet (25 mg) by mouth daily  Qty: 30 tablet, Refills: 0    Associated Diagnoses: Hypertension, unspecified type      pantoprazole (PROTONIX) 40 MG EC tablet Take 1 tablet (40 mg) by mouth daily  Qty: 30 tablet, Refills: 0    Associated Diagnoses: Gastroesophageal reflux disease with esophagitis, unspecified whether hemorrhage         STOP taking these medications       EPINEPHrine (ANY BX GENERIC EQUIV) 0.3 MG/0.3ML injection 2-pack Comments:   Reason for Stopping: for bee stings                   Psychiatric Examination:   Appearance:  awake, alert and adequately groomed  Attitude:  cooperative  Eye Contact:  good  Mood:  better  Affect:  appropriate and in normal range  Speech:  clear, coherent  Psychomotor Behavior:  no evidence of tardive dyskinesia, dystonia, or tics  Thought Process:  logical, linear and goal oriented  Associations:  no loose associations  Thought Content:  no evidence of suicidal ideation or homicidal ideation  Insight:  good  Judgment:  fair  Oriented to:  time, person, and place  Attention Span and Concentration:  intact  Recent and Remote Memory:  intact  Language: Able to name objects, Able to repeat phrases and Able to read and write  Fund of Knowledge: appropriate  Muscle Strength and Tone: normal  Gait and Station: Normal         Discharge Plan:       Further instructions from your care team       Behavioral Discharge Planning and Instructions  THANK YOU FOR CHOOSING THE [address]    Summary: You were admitted to [address] [place] [address] on treatment and evaluation of alcohol use. You are being discharged today and the treatment teams recommendations are: Ad[facility] to [facility] program after completing medical detox, and follow all aftercare recommendations.     Recommendation:    [facility] ([phone]).   [address] [place] [address]    Main Diagnoses: Per Dr. Jackson psychiatrist    Alcohol use disorder severe   Alcohol withdrawal severe  Major depressive disorder moderate recurrent without psychosis  Nicotine use disorder moderate      Major Treatments, Procedures and Findings: You alcohol withdrawal was treated with ativan using the Modified Selective Severity Assessment (MSSA) protocol    You were followed by Psychiatry and Medical. You met with Case Management to complete a chemical health assessment and for discharge planning. You were given a copy of your lab results which were reviewed with you. Please bring your lab results with you to your primary follow up appointment. Make your recovery a priority, Rosey     Symptoms to Report: If  you experience feeling more anxiety, confusion, losing more sleep, mood declining or thoughts of suicide, notify your treatment team or notify your primary physician. IF ANY OF THE SYMPTOMS YOU ARE EXPERIENCING ARE A MEDICAL EMERGENCY CALL 911 IMMEDIATELY.    Lifestyle Adjustment: Health Action Plan:  1.Create a daily schedule  2. Eat Healthy  3. Plan Enjoyable Sober Activities  4. Use Problem Solving Skills and Deal with Issues as they Arise.   5. Be Physically Active  6. Take your medications as prescribed  7. Get enough restful sleep  8. Practice Relaxation  9. Spend time with Supportive People  10. No use of alcohol, illegal drugs or addictive medications other than what is currently prescribed.   11.AA, NA Sponsor are excellent resources for support  12. Explore how  you can utilize spirituality in your recovery      Medical Provider Follow Up:  You are aware you should make a follow up appointment with your primary care doctor for medical and medication management    Support Group:  AA/NA and Sponsor contact/support    Resources:                   Great Pod casts for nutrition and wellness  Listen on Apple Podcasts  Dishing Up  Nutrition   [employer]   Nutrition       Understand the connection between what you eat and how you feel. Hosted by licensed nutritionists and dietitians from [employer] we share practical, real-life solutions for healthier living through nutrition.       Recovery apps for your phone to locate current in person and zoom recovery meetings  [place] - meeting sahara  AA  - meeting sahara  Meeting guide - meeting sahara  Quick NA meeting - meeting sahara  [place]- has various apps      Crisis Intervention: 251.509.4979 or [phone] (TTY: 780.497.6138).  Call anytime for help.  Suicide Awareness Voices of Education (SAVE) (www.save.org): 218-822-IWVQ (2508)  National Suicide Prevention Line (www.mentalTripleLiftmn.org): 700-737-AGZI (6024)  National [place] on Mental Illness (www.[place].shamika.org): 786.285.8541 or 430-430-9997.  Alcoholics Anonymous (www.alcoholics-anonymous.org): Or local information can be found 24 hours a day at:   AA Intergroup service office in [place] (http://www.aastpaul.org/) 453.938.7688  AA Intergroup service office in [facility]: [phone]. (http://www.a[place].org/)  Narcotics Anonymous (www.nam[place].org)1-975-264-4889   [facility] Behavioral Intake [phone]    [facility] ([facility])  [facility] connects people seeking recovery to resources that help foster and sustain long-term recovery.  Whether you are seeking resources for treatment, transportation, housing, job training, education, health care or other pathways to recovery, [facility] is a great place to start.  [phone]. Www.[facility].org    General Medication Instructions:   See your medication sheet(s) for instructions. Take all medicines as directed.  Make no changes unless your doctor directs them. Go to all your doctor visits. Be sure to have all your required lab tests. This way, your medicines can be refilled in timely fashion. Do  not use any drugs not prescribed by your provider. AA/NA and Sponsors are excellent resources for support. Avoid alcohol.    Please Note: If you have any questions at anytime after you are discharged please call the [facility] [place] detox unit 3AW unit at [phone].  [facility], Behavioral Intake [phone]  Please take this discharge folder with you to all your follow up appointments, it contains your lab results, diagnosis, medication list and discharge recommendations.      THANK YOU FOR CHOOSING THE [facility]           Attestation:  The patient has been seen and evaluated by me,  Debra A. Naegele, APRN CNS on 12/25/2021  Discharge summary time > 30 minutes

## 2021-12-27 ENCOUNTER — PATIENT OUTREACH (OUTPATIENT)
Dept: CARE COORDINATION | Facility: CLINIC | Age: 51
End: 2021-12-27
Payer: COMMERCIAL

## 2021-12-27 DIAGNOSIS — Z71.89 OTHER SPECIFIED COUNSELING: ICD-10-CM

## 2021-12-27 NOTE — PROGRESS NOTES
Clinic Care Coordination Contact  [facility]: Post-Discharge Note  SITUATION                                                      Admission:    Admission Date: 12/23/21   Reason for Admission: Alcohol abuse  Discharge:   Discharge Date: 12/25/21  Discharge Diagnosis: Alcohol abuse    BACKGROUND                                                      Patient is a 51-year-old  female, who came to the emergency room after that intervention.  Patient reports that she was a social drinker.  In 2019 she had to move her mother to a nursing home.  That when her drinking increased.  She began to drink daily 1 pint of fireball.  When she stopped drinking she gets anxiety.  Family also noticed that she was drinking and driving  Patient came to the emergency room yesterday for detox however there were no detox bed she went home and continued to drink.  She came again to the emergency room and was admitted for detox.  She has not been eating     Patient has tolerance, withdrawal, progressive use, loss of control, spending more time and more amount than intended. Patient has made attempts to quit, is experiencing cravings, and reports negative consequences.    Rosey Tucker was admitted to [facility] with attending Juan David Lafleur MD as a voluntary patient. The patient was placed under status 15 (15 minute checks) to ensure patient safety.         MSSA protocol was initiated due to the patient's history of alcohol abuse and concern for withdrawal symptoms.     Patient was continued on citalopram 40 mg to address depressive and anxiety symptoms.      Rosey Tucker did participate in groups and was visible in the milieu.      The patient's symptoms of alcohol withdrawal resolved.  Patient reports improved mood and denies suicidal ideation. She was continued on Lamictal 100 mg to address mood instability and depressive symptoms. Provider explained Chris-Owen's syndrome. Metoprolol ER 25 mg was continued for  hypertension and Protonix for GERD. Discussed risks, benefits and side effects of medication to patient. .      [facility] At the time of discharge Rosey Tucker was determined to not be a danger to herself or others.     ASSESSMENT      Enrollment  Primary Care Care Coordination Status: Not a Candidate    Discharge Assessment  How are you doing now that you are home?: Pt reports she is doing okay and that she is current at [facility]. Pt reports she currently has no questions or concerns reagrding her dischrage intructions.  How are your symptoms? (Red Flag symptoms escalate to triage hotline per guidelines): Improved  Do you feel your condition is stable enough to be safe at home until your provider visit?: Yes  Does the patient have their discharge instructions? : Yes  Does the patient have questions regarding their discharge instructions? : No  Were you started on any new medications or were there changes to any of your previous medications? : Yes  Does the patient have all of their medications?: Yes  Do you have questions regarding any of your medications? : No  Do you have all of your needed medical supplies or equipment (DME)?  (i.e. oxygen tank, CPAP, cane, etc.): Yes  Discharge follow-up appointment scheduled within 14 calendar days? : Yes  Discharge Follow Up Appointment Date:  (currently at residential treatment)  Discharge Follow Up Appointment Scheduled with?: Specialty Care Provider              PLAN                                                      Outpatient Plan:  Admit to [facility] after completing medical detox, and follow all aftercare recommendations.      Recommendation:   [facility] ([phone]).   [address], [place] [id number]    No future appointments.      For any urgent concerns, please contact our 24 hour nurse triage line: [phone] ([phone])          Anabell Medina, LOUISE  [phone]  [facility]

## 2022-01-28 ENCOUNTER — LAB REQUISITION (OUTPATIENT)
Dept: LAB | Facility: CLINIC | Age: 52
End: 2022-01-28

## 2022-01-28 DIAGNOSIS — N91.2 AMENORRHEA, UNSPECIFIED: ICD-10-CM

## 2022-01-28 DIAGNOSIS — F10.230 ALCOHOL DEPENDENCE WITH WITHDRAWAL, UNCOMPLICATED (H): ICD-10-CM

## 2022-01-28 LAB
ALBUMIN SERPL-MCNC: 4.3 G/DL (ref 3.5–5)
ALP SERPL-CCNC: 81 U/L (ref 45–120)
ALT SERPL W P-5'-P-CCNC: 20 U/L (ref 0–45)
AST SERPL W P-5'-P-CCNC: 22 U/L (ref 0–40)
BILIRUB DIRECT SERPL-MCNC: 0.2 MG/DL
BILIRUB SERPL-MCNC: 0.7 MG/DL (ref 0–1)
GGT SERPL-CCNC: 30 U/L (ref 0–50)
PROT SERPL-MCNC: 6.9 G/DL (ref 6–8)

## 2022-01-28 PROCEDURE — 83001 ASSAY OF GONADOTROPIN (FSH): CPT | Performed by: PHYSICIAN ASSISTANT

## 2022-01-28 PROCEDURE — 82977 ASSAY OF GGT: CPT | Performed by: PHYSICIAN ASSISTANT

## 2022-01-28 PROCEDURE — 82040 ASSAY OF SERUM ALBUMIN: CPT | Performed by: PHYSICIAN ASSISTANT

## 2022-01-29 LAB — FSH SERPL-ACNC: 52 MIU/ML

## 2022-02-11 ENCOUNTER — LAB REQUISITION (OUTPATIENT)
Dept: LAB | Facility: CLINIC | Age: 52
End: 2022-02-11

## 2022-02-11 DIAGNOSIS — Z01.419 ENCOUNTER FOR GYNECOLOGICAL EXAMINATION (GENERAL) (ROUTINE) WITHOUT ABNORMAL FINDINGS: ICD-10-CM

## 2022-02-11 PROCEDURE — G0123 SCREEN CERV/VAG THIN LAYER: HCPCS | Performed by: PHYSICIAN ASSISTANT

## 2022-02-11 PROCEDURE — 87624 HPV HI-RISK TYP POOLED RSLT: CPT | Performed by: PHYSICIAN ASSISTANT

## 2022-02-15 LAB
HUMAN PAPILLOMA VIRUS 16 DNA: NEGATIVE
HUMAN PAPILLOMA VIRUS 18 DNA: NEGATIVE
HUMAN PAPILLOMA VIRUS FINAL DIAGNOSIS: NORMAL
HUMAN PAPILLOMA VIRUS OTHER HR: NEGATIVE

## 2022-02-18 LAB
BKR LAB AP GYN ADEQUACY: NORMAL
BKR LAB AP GYN INTERPRETATION: NORMAL
BKR LAB AP HPV REFLEX: NORMAL
BKR LAB AP LMP: NORMAL
BKR LAB AP PREVIOUS ABNORMAL: NORMAL
PATH REPORT.COMMENTS IMP SPEC: NORMAL
PATH REPORT.COMMENTS IMP SPEC: NORMAL
PATH REPORT.RELEVANT HX SPEC: NORMAL

## 2022-09-11 ENCOUNTER — HEALTH MAINTENANCE LETTER (OUTPATIENT)
Age: 52
End: 2022-09-11

## 2023-01-22 ENCOUNTER — HEALTH MAINTENANCE LETTER (OUTPATIENT)
Age: 53
End: 2023-01-22

## 2023-10-07 ENCOUNTER — HEALTH MAINTENANCE LETTER (OUTPATIENT)
Age: 53
End: 2023-10-07

## 2023-12-16 ENCOUNTER — HEALTH MAINTENANCE LETTER (OUTPATIENT)
Age: 53
End: 2023-12-16

## 2024-05-20 ENCOUNTER — LAB REQUISITION (OUTPATIENT)
Dept: LAB | Facility: HOSPITAL | Age: 54
End: 2024-05-20
Payer: COMMERCIAL

## 2024-05-20 DIAGNOSIS — Z11.1 ENCOUNTER FOR SCREENING FOR RESPIRATORY TUBERCULOSIS: ICD-10-CM

## 2024-05-20 PROCEDURE — 86481 TB AG RESPONSE T-CELL SUSP: CPT | Performed by: FAMILY MEDICINE

## 2024-05-21 LAB
QUANTIFERON MITOGEN: 10 IU/ML
QUANTIFERON NIL TUBE: 0.01 IU/ML
QUANTIFERON TB1 TUBE: 0.02 IU/ML
QUANTIFERON TB2 TUBE: 0.02

## 2024-05-22 LAB
GAMMA INTERFERON BACKGROUND BLD IA-ACNC: 0.01 IU/ML
M TB IFN-G BLD-IMP: NEGATIVE
M TB IFN-G CD4+ BCKGRND COR BLD-ACNC: 9.99 IU/ML
MITOGEN IGNF BCKGRD COR BLD-ACNC: 0.01 IU/ML
MITOGEN IGNF BCKGRD COR BLD-ACNC: 0.01 IU/ML

## 2024-07-29 ENCOUNTER — LAB REQUISITION (OUTPATIENT)
Dept: LAB | Facility: CLINIC | Age: 54
End: 2024-07-29
Payer: COMMERCIAL

## 2024-07-29 DIAGNOSIS — I10 ESSENTIAL (PRIMARY) HYPERTENSION: ICD-10-CM

## 2024-07-29 DIAGNOSIS — E04.9 NONTOXIC GOITER, UNSPECIFIED: ICD-10-CM

## 2024-07-29 PROCEDURE — 84443 ASSAY THYROID STIM HORMONE: CPT | Performed by: FAMILY MEDICINE

## 2024-07-29 PROCEDURE — 80053 COMPREHEN METABOLIC PANEL: CPT | Performed by: FAMILY MEDICINE

## 2024-07-30 LAB
ALBUMIN SERPL BCG-MCNC: 4.4 G/DL (ref 3.5–5.2)
ALP SERPL-CCNC: 68 U/L (ref 40–150)
ALT SERPL W P-5'-P-CCNC: 17 U/L (ref 0–50)
ANION GAP SERPL CALCULATED.3IONS-SCNC: 12 MMOL/L (ref 7–15)
AST SERPL W P-5'-P-CCNC: 24 U/L (ref 0–45)
BILIRUB SERPL-MCNC: 0.3 MG/DL
BUN SERPL-MCNC: 18.9 MG/DL (ref 6–20)
CALCIUM SERPL-MCNC: 9.6 MG/DL (ref 8.8–10.4)
CHLORIDE SERPL-SCNC: 105 MMOL/L (ref 98–107)
CREAT SERPL-MCNC: 0.94 MG/DL (ref 0.51–0.95)
EGFRCR SERPLBLD CKD-EPI 2021: 71 ML/MIN/1.73M2
GLUCOSE SERPL-MCNC: 88 MG/DL (ref 70–99)
HCO3 SERPL-SCNC: 26 MMOL/L (ref 22–29)
POTASSIUM SERPL-SCNC: 3.7 MMOL/L (ref 3.4–5.3)
PROT SERPL-MCNC: 6.9 G/DL (ref 6.4–8.3)
SODIUM SERPL-SCNC: 143 MMOL/L (ref 135–145)
TSH SERPL DL<=0.005 MIU/L-ACNC: 1.16 UIU/ML (ref 0.3–4.2)

## 2024-11-30 ENCOUNTER — HEALTH MAINTENANCE LETTER (OUTPATIENT)
Age: 54
End: 2024-11-30